# Patient Record
Sex: FEMALE | Race: WHITE | NOT HISPANIC OR LATINO | Employment: OTHER | URBAN - METROPOLITAN AREA
[De-identification: names, ages, dates, MRNs, and addresses within clinical notes are randomized per-mention and may not be internally consistent; named-entity substitution may affect disease eponyms.]

---

## 2018-04-20 PROBLEM — F33.1 MODERATE EPISODE OF RECURRENT MAJOR DEPRESSIVE DISORDER (HCC): Status: ACTIVE | Noted: 2018-04-20

## 2018-04-20 PROBLEM — R23.2 HOT FLASHES: Status: ACTIVE | Noted: 2018-04-20

## 2019-06-05 PROBLEM — H91.93 DECREASED HEARING OF BOTH EARS: Status: ACTIVE | Noted: 2019-06-05

## 2019-06-05 PROBLEM — H93.13 RINGING IN EARS, BILATERAL: Status: ACTIVE | Noted: 2019-06-05

## 2019-07-01 PROBLEM — J30.1 SEASONAL ALLERGIC RHINITIS DUE TO POLLEN: Status: ACTIVE | Noted: 2019-07-01

## 2019-07-08 ENCOUNTER — PREP FOR PROCEDURE (OUTPATIENT)
Dept: SCHEDULING | Facility: MEDICAL CENTER | Age: 74
End: 2019-07-08

## 2019-07-08 PROBLEM — Z96.659 HX OF TOTAL KNEE ARTHROPLASTY: Status: ACTIVE | Noted: 2019-07-08

## 2019-07-08 PROBLEM — T84.84XA PAIN DUE TO INTERNAL ORTHOPEDIC PROSTHETIC DEVICE (HCC): Status: ACTIVE | Noted: 2019-07-08

## 2019-07-08 RX ORDER — ACETAMINOPHEN 500 MG
1000 TABLET ORAL ONCE
Status: CANCELLED | OUTPATIENT
Start: 2019-07-08 | End: 2019-07-08

## 2019-07-08 RX ORDER — CELECOXIB 200 MG/1
200 CAPSULE ORAL ONCE
Status: CANCELLED | OUTPATIENT
Start: 2019-07-08 | End: 2019-07-08

## 2019-07-08 RX ORDER — GABAPENTIN 300 MG/1
600 CAPSULE ORAL ONCE
Status: CANCELLED | OUTPATIENT
Start: 2019-07-08 | End: 2019-07-08

## 2019-07-08 RX ORDER — CEFAZOLIN SODIUM 2 G/100ML
2 INJECTION, SOLUTION INTRAVENOUS ONCE
Status: CANCELLED | OUTPATIENT
Start: 2019-07-08 | End: 2019-07-08

## 2019-08-05 DIAGNOSIS — Z01.812 PRE-OPERATIVE LABORATORY EXAMINATION: Primary | ICD-10-CM

## 2019-08-05 LAB — EKG IMPRESSION: NORMAL

## 2019-08-05 PROCEDURE — 87640 STAPH A DNA AMP PROBE: CPT | Mod: XU

## 2019-08-05 PROCEDURE — 87641 MR-STAPH DNA AMP PROBE: CPT

## 2019-08-05 PROCEDURE — 93010 ELECTROCARDIOGRAM REPORT: CPT | Performed by: INTERNAL MEDICINE

## 2019-08-05 PROCEDURE — 93005 ELECTROCARDIOGRAM TRACING: CPT | Performed by: ORTHOPAEDIC SURGERY

## 2019-08-05 PROCEDURE — 85730 THROMBOPLASTIN TIME PARTIAL: CPT

## 2019-08-05 PROCEDURE — 36415 COLL VENOUS BLD VENIPUNCTURE: CPT

## 2019-08-05 PROCEDURE — 85610 PROTHROMBIN TIME: CPT

## 2019-08-05 RX ORDER — ACETAMINOPHEN 325 MG/1
650 TABLET ORAL EVERY 4 HOURS PRN
COMMUNITY
End: 2023-03-18

## 2019-08-05 RX ORDER — LIOTHYRONINE SODIUM 5 UG/1
5 TABLET ORAL EVERY EVENING
COMMUNITY
End: 2020-07-24

## 2019-08-05 RX ORDER — LIOTHYRONINE SODIUM 25 UG/1
25 TABLET ORAL EVERY MORNING
Status: ON HOLD | COMMUNITY
End: 2019-08-19

## 2019-08-05 RX ORDER — MULTIVIT WITH MINERALS/LUTEIN
1000 TABLET ORAL DAILY
COMMUNITY
End: 2023-03-18

## 2019-08-05 NOTE — OR NURSING
"TOTAL JOINT REPLACEMENT SURGERY   PreAdmit Appointment  Pre-Operative Education Note       1) Did you take a Total Joint Replacement Pre-Operative Education class?  Where?  Answer: no    2) If you did not take a class, did you receive pre-op education in the form of a book or through an online class?    Answer: nlo    3) Have you had the same joint replacement procedure within the last 3 years?   Answer:no (4 yrs in October)    For patients who answered \"No\" to the above questions:     4) Was patient given information on [facility] Pre-Op Education class through the Pre-Op Education Class flyer or the Alternative Pre-op Education flyer?   Answer: yes     5) Was a [facility] Total Joint Replacement Patient Guide binder handed out?   Answer: yes    6) Did the patient refuse preoperative education?  Answer:  no    DISCHARGE PLANNING NOTE - TOTAL JOINT    Procedure: Procedure(s):  REVISION, TOTAL ARTHROPLASTY, KNEE, ALL COMPONENTS  Procedure Date: 8/19/2019  Insurance:    Equipment currently available at home? Walker, grabber  Steps into the home? 2  Steps within the home? 0  Toilet height? elevated  Type of shower? Walk in with bars.  Who will be with you during your recovery? Friend, Brendan who is also her physical therapist  Is Outpatient Physical Therapy set up after surgery? Yes  Did you take the Total Joint Class and where? No     Plan: Pt given home safety checklist and equipment resource guide and reviewed with pt.   "

## 2019-08-05 NOTE — OR NURSING
"Pre-admit appointment completed. \"Preparing for your procedure\" sheet given to pt along with verbal and written instructions. Pt instructed to continue regularly prescribed medications through the day before surgery. Pt instructed to take the following medications the day of surgery with a sip of water, per anesthesia protocol;  Cytomel 35 mcg dose and if needed-tylenol.     Pt states she had clearance for surgery from her PCP and she took it to the [facility]. LM naveed Jo at [facility] to please fax clearance note.    Pt denies pain, burn, or frequency with urination.   "

## 2019-08-06 LAB
APTT PPP: 31.8 SEC (ref 24.7–36)
INR PPP: 0.93 (ref 0.87–1.13)
PROTHROMBIN TIME: 12.6 SEC (ref 12–14.6)
SCCMEC + MECA PNL NOSE NAA+PROBE: NEGATIVE
SCCMEC + MECA PNL NOSE NAA+PROBE: NEGATIVE

## 2019-08-16 ENCOUNTER — ANESTHESIA EVENT (OUTPATIENT)
Dept: SURGERY | Facility: MEDICAL CENTER | Age: 74
DRG: 468 | End: 2019-08-16
Payer: MEDICARE

## 2019-08-19 ENCOUNTER — ANESTHESIA (OUTPATIENT)
Dept: SURGERY | Facility: MEDICAL CENTER | Age: 74
DRG: 468 | End: 2019-08-19
Payer: MEDICARE

## 2019-08-19 ENCOUNTER — APPOINTMENT (OUTPATIENT)
Dept: RADIOLOGY | Facility: MEDICAL CENTER | Age: 74
DRG: 468 | End: 2019-08-19
Attending: ORTHOPAEDIC SURGERY
Payer: MEDICARE

## 2019-08-19 ENCOUNTER — HOSPITAL ENCOUNTER (INPATIENT)
Facility: MEDICAL CENTER | Age: 74
LOS: 1 days | DRG: 468 | End: 2019-08-20
Attending: ORTHOPAEDIC SURGERY | Admitting: ORTHOPAEDIC SURGERY
Payer: MEDICARE

## 2019-08-19 DIAGNOSIS — T84.84XD PAIN DUE TO INTERNAL ORTHOPEDIC PROSTHETIC DEVICE, SUBSEQUENT ENCOUNTER: ICD-10-CM

## 2019-08-19 PROCEDURE — 160029 HCHG SURGERY MINUTES - 1ST 30 MINS LEVEL 4: Performed by: ORTHOPAEDIC SURGERY

## 2019-08-19 PROCEDURE — 770001 HCHG ROOM/CARE - MED/SURG/GYN PRIV*

## 2019-08-19 PROCEDURE — 700105 HCHG RX REV CODE 258: Performed by: ORTHOPAEDIC SURGERY

## 2019-08-19 PROCEDURE — 160048 HCHG OR STATISTICAL LEVEL 1-5: Performed by: ORTHOPAEDIC SURGERY

## 2019-08-19 PROCEDURE — 73560 X-RAY EXAM OF KNEE 1 OR 2: CPT | Mod: RT

## 2019-08-19 PROCEDURE — 501838 HCHG SUTURE GENERAL: Performed by: ORTHOPAEDIC SURGERY

## 2019-08-19 PROCEDURE — 94760 N-INVAS EAR/PLS OXIMETRY 1: CPT

## 2019-08-19 PROCEDURE — 160035 HCHG PACU - 1ST 60 MINS PHASE I: Performed by: ORTHOPAEDIC SURGERY

## 2019-08-19 PROCEDURE — 700111 HCHG RX REV CODE 636 W/ 250 OVERRIDE (IP): Performed by: ANESTHESIOLOGY

## 2019-08-19 PROCEDURE — 700102 HCHG RX REV CODE 250 W/ 637 OVERRIDE(OP): Performed by: ORTHOPAEDIC SURGERY

## 2019-08-19 PROCEDURE — 700105 HCHG RX REV CODE 258: Performed by: ANESTHESIOLOGY

## 2019-08-19 PROCEDURE — C1776 JOINT DEVICE (IMPLANTABLE): HCPCS | Performed by: ORTHOPAEDIC SURGERY

## 2019-08-19 PROCEDURE — 0SPC0JZ REMOVAL OF SYNTHETIC SUBSTITUTE FROM RIGHT KNEE JOINT, OPEN APPROACH: ICD-10-PCS | Performed by: ORTHOPAEDIC SURGERY

## 2019-08-19 PROCEDURE — 160002 HCHG RECOVERY MINUTES (STAT): Performed by: ORTHOPAEDIC SURGERY

## 2019-08-19 PROCEDURE — 160022 HCHG BLOCK: Performed by: ORTHOPAEDIC SURGERY

## 2019-08-19 PROCEDURE — A9270 NON-COVERED ITEM OR SERVICE: HCPCS | Performed by: ORTHOPAEDIC SURGERY

## 2019-08-19 PROCEDURE — L8699 PROSTHETIC IMPLANT NOS: HCPCS | Performed by: ORTHOPAEDIC SURGERY

## 2019-08-19 PROCEDURE — 700101 HCHG RX REV CODE 250: Performed by: ORTHOPAEDIC SURGERY

## 2019-08-19 PROCEDURE — 700111 HCHG RX REV CODE 636 W/ 250 OVERRIDE (IP): Performed by: ORTHOPAEDIC SURGERY

## 2019-08-19 PROCEDURE — 502000 HCHG MISC OR IMPLANTS RC 0278: Performed by: ORTHOPAEDIC SURGERY

## 2019-08-19 PROCEDURE — 0SRC0J9 REPLACEMENT OF RIGHT KNEE JOINT WITH SYNTHETIC SUBSTITUTE, CEMENTED, OPEN APPROACH: ICD-10-PCS | Performed by: ORTHOPAEDIC SURGERY

## 2019-08-19 PROCEDURE — A4306 DRUG DELIVERY SYSTEM <=50 ML: HCPCS | Performed by: ANESTHESIOLOGY

## 2019-08-19 PROCEDURE — 502240 HCHG MISC OR SUPPLY RC 0272: Performed by: ORTHOPAEDIC SURGERY

## 2019-08-19 PROCEDURE — 502579 HCHG PACK, TOTAL KNEE: Performed by: ORTHOPAEDIC SURGERY

## 2019-08-19 PROCEDURE — 160041 HCHG SURGERY MINUTES - EA ADDL 1 MIN LEVEL 4: Performed by: ORTHOPAEDIC SURGERY

## 2019-08-19 PROCEDURE — 700101 HCHG RX REV CODE 250: Performed by: ANESTHESIOLOGY

## 2019-08-19 PROCEDURE — 160009 HCHG ANES TIME/MIN: Performed by: ORTHOPAEDIC SURGERY

## 2019-08-19 PROCEDURE — 160036 HCHG PACU - EA ADDL 30 MINS PHASE I: Performed by: ORTHOPAEDIC SURGERY

## 2019-08-19 PROCEDURE — 700112 HCHG RX REV CODE 229: Performed by: ORTHOPAEDIC SURGERY

## 2019-08-19 DEVICE — STEM PRESS FIT LEGION 13MM X 160MM: Type: IMPLANTABLE DEVICE | Site: KNEE | Status: FUNCTIONAL

## 2019-08-19 DEVICE — IMPLANTABLE DEVICE: Type: IMPLANTABLE DEVICE | Site: KNEE | Status: FUNCTIONAL

## 2019-08-19 DEVICE — CEMENT BONE SIMPLEX W/GENTAICIN (10/PK): Type: IMPLANTABLE DEVICE | Site: KNEE | Status: FUNCTIONAL

## 2019-08-19 DEVICE — COUPLER OFFSET LEGION 4MM: Type: IMPLANTABLE DEVICE | Site: KNEE | Status: FUNCTIONAL

## 2019-08-19 RX ORDER — CHLORPROMAZINE HYDROCHLORIDE 25 MG/1
25 TABLET, FILM COATED ORAL EVERY 6 HOURS PRN
Status: DISCONTINUED | OUTPATIENT
Start: 2019-08-19 | End: 2019-08-20 | Stop reason: HOSPADM

## 2019-08-19 RX ORDER — HALOPERIDOL 5 MG/ML
1 INJECTION INTRAMUSCULAR
Status: DISCONTINUED | OUTPATIENT
Start: 2019-08-19 | End: 2019-08-19 | Stop reason: HOSPADM

## 2019-08-19 RX ORDER — GABAPENTIN 300 MG/1
300 CAPSULE ORAL 3 TIMES DAILY
Status: DISCONTINUED | OUTPATIENT
Start: 2019-08-19 | End: 2019-08-20 | Stop reason: HOSPADM

## 2019-08-19 RX ORDER — SODIUM CHLORIDE, SODIUM LACTATE, POTASSIUM CHLORIDE, CALCIUM CHLORIDE 600; 310; 30; 20 MG/100ML; MG/100ML; MG/100ML; MG/100ML
INJECTION, SOLUTION INTRAVENOUS CONTINUOUS
Status: ACTIVE | OUTPATIENT
Start: 2019-08-19 | End: 2019-08-19

## 2019-08-19 RX ORDER — LIOTHYRONINE SODIUM 5 UG/1
10 TABLET ORAL EVERY MORNING
Status: DISCONTINUED | OUTPATIENT
Start: 2019-08-20 | End: 2019-08-20 | Stop reason: HOSPADM

## 2019-08-19 RX ORDER — ONDANSETRON 2 MG/ML
4 INJECTION INTRAMUSCULAR; INTRAVENOUS EVERY 4 HOURS PRN
Status: DISCONTINUED | OUTPATIENT
Start: 2019-08-19 | End: 2019-08-20 | Stop reason: HOSPADM

## 2019-08-19 RX ORDER — CEFAZOLIN SODIUM 1 G/3ML
INJECTION, POWDER, FOR SOLUTION INTRAMUSCULAR; INTRAVENOUS PRN
Status: DISCONTINUED | OUTPATIENT
Start: 2019-08-19 | End: 2019-08-19 | Stop reason: SURG

## 2019-08-19 RX ORDER — ROPIVACAINE HYDROCHLORIDE 5 MG/ML
INJECTION, SOLUTION EPIDURAL; INFILTRATION; PERINEURAL PRN
Status: DISCONTINUED | OUTPATIENT
Start: 2019-08-19 | End: 2019-08-19 | Stop reason: SURG

## 2019-08-19 RX ORDER — ASCORBIC ACID 500 MG
1000 TABLET ORAL DAILY
Status: DISCONTINUED | OUTPATIENT
Start: 2019-08-19 | End: 2019-08-20 | Stop reason: HOSPADM

## 2019-08-19 RX ORDER — HALOPERIDOL 5 MG/ML
1 INJECTION INTRAMUSCULAR EVERY 6 HOURS PRN
Status: DISCONTINUED | OUTPATIENT
Start: 2019-08-19 | End: 2019-08-20 | Stop reason: HOSPADM

## 2019-08-19 RX ORDER — TRAMADOL HYDROCHLORIDE 50 MG/1
50 TABLET ORAL EVERY 6 HOURS PRN
Status: DISCONTINUED | OUTPATIENT
Start: 2019-08-19 | End: 2019-08-19 | Stop reason: HOSPADM

## 2019-08-19 RX ORDER — DEXAMETHASONE SODIUM PHOSPHATE 4 MG/ML
4 INJECTION, SOLUTION INTRA-ARTICULAR; INTRALESIONAL; INTRAMUSCULAR; INTRAVENOUS; SOFT TISSUE
Status: DISCONTINUED | OUTPATIENT
Start: 2019-08-19 | End: 2019-08-20 | Stop reason: HOSPADM

## 2019-08-19 RX ORDER — HYDROMORPHONE HYDROCHLORIDE 2 MG/ML
INJECTION, SOLUTION INTRAMUSCULAR; INTRAVENOUS; SUBCUTANEOUS PRN
Status: DISCONTINUED | OUTPATIENT
Start: 2019-08-19 | End: 2019-08-19 | Stop reason: SURG

## 2019-08-19 RX ORDER — TRANEXAMIC ACID 100 MG/ML
INJECTION, SOLUTION INTRAVENOUS PRN
Status: DISCONTINUED | OUTPATIENT
Start: 2019-08-19 | End: 2019-08-19 | Stop reason: SURG

## 2019-08-19 RX ORDER — LIOTHYRONINE SODIUM 5 UG/1
5 TABLET ORAL EVERY EVENING
Status: DISCONTINUED | OUTPATIENT
Start: 2019-08-19 | End: 2019-08-20 | Stop reason: HOSPADM

## 2019-08-19 RX ORDER — ONDANSETRON 2 MG/ML
4 INJECTION INTRAMUSCULAR; INTRAVENOUS
Status: DISCONTINUED | OUTPATIENT
Start: 2019-08-19 | End: 2019-08-19 | Stop reason: HOSPADM

## 2019-08-19 RX ORDER — KETOROLAC TROMETHAMINE 30 MG/ML
15 INJECTION, SOLUTION INTRAMUSCULAR; INTRAVENOUS EVERY 6 HOURS
Status: DISCONTINUED | OUTPATIENT
Start: 2019-08-19 | End: 2019-08-20 | Stop reason: HOSPADM

## 2019-08-19 RX ORDER — HYDROMORPHONE HYDROCHLORIDE 1 MG/ML
0.5 INJECTION, SOLUTION INTRAMUSCULAR; INTRAVENOUS; SUBCUTANEOUS
Status: DISCONTINUED | OUTPATIENT
Start: 2019-08-19 | End: 2019-08-20 | Stop reason: HOSPADM

## 2019-08-19 RX ORDER — DEXMEDETOMIDINE HYDROCHLORIDE 100 UG/ML
INJECTION, SOLUTION INTRAVENOUS PRN
Status: DISCONTINUED | OUTPATIENT
Start: 2019-08-19 | End: 2019-08-19 | Stop reason: HOSPADM

## 2019-08-19 RX ORDER — AMOXICILLIN 250 MG
1 CAPSULE ORAL
Status: DISCONTINUED | OUTPATIENT
Start: 2019-08-19 | End: 2019-08-20 | Stop reason: HOSPADM

## 2019-08-19 RX ORDER — ACETAMINOPHEN 500 MG
1000 TABLET ORAL EVERY 6 HOURS
Status: DISCONTINUED | OUTPATIENT
Start: 2019-08-19 | End: 2019-08-20 | Stop reason: HOSPADM

## 2019-08-19 RX ORDER — CHLORPROMAZINE HYDROCHLORIDE 25 MG/ML
25 INJECTION INTRAMUSCULAR EVERY 6 HOURS PRN
Status: DISCONTINUED | OUTPATIENT
Start: 2019-08-19 | End: 2019-08-20 | Stop reason: HOSPADM

## 2019-08-19 RX ORDER — TRAMADOL HYDROCHLORIDE 50 MG/1
50-100 TABLET ORAL EVERY 4 HOURS PRN
Status: DISCONTINUED | OUTPATIENT
Start: 2019-08-19 | End: 2019-08-20 | Stop reason: HOSPADM

## 2019-08-19 RX ORDER — DIPHENHYDRAMINE HCL 25 MG
25 TABLET ORAL EVERY 6 HOURS PRN
Status: DISCONTINUED | OUTPATIENT
Start: 2019-08-19 | End: 2019-08-20 | Stop reason: HOSPADM

## 2019-08-19 RX ORDER — DIPHENHYDRAMINE HYDROCHLORIDE 50 MG/ML
25 INJECTION INTRAMUSCULAR; INTRAVENOUS EVERY 6 HOURS PRN
Status: DISCONTINUED | OUTPATIENT
Start: 2019-08-19 | End: 2019-08-20 | Stop reason: HOSPADM

## 2019-08-19 RX ORDER — MEPERIDINE HYDROCHLORIDE 25 MG/ML
12.5 INJECTION INTRAMUSCULAR; INTRAVENOUS; SUBCUTANEOUS
Status: DISCONTINUED | OUTPATIENT
Start: 2019-08-19 | End: 2019-08-19 | Stop reason: HOSPADM

## 2019-08-19 RX ORDER — ENEMA 19; 7 G/133ML; G/133ML
1 ENEMA RECTAL
Status: DISCONTINUED | OUTPATIENT
Start: 2019-08-19 | End: 2019-08-20 | Stop reason: HOSPADM

## 2019-08-19 RX ORDER — SCOLOPAMINE TRANSDERMAL SYSTEM 1 MG/1
1 PATCH, EXTENDED RELEASE TRANSDERMAL
Status: DISCONTINUED | OUTPATIENT
Start: 2019-08-19 | End: 2019-08-20 | Stop reason: HOSPADM

## 2019-08-19 RX ORDER — ROPIVACAINE HYDROCHLORIDE 5 MG/ML
INJECTION, SOLUTION EPIDURAL; INFILTRATION; PERINEURAL
Status: DISCONTINUED | OUTPATIENT
Start: 2019-08-19 | End: 2019-08-19 | Stop reason: HOSPADM

## 2019-08-19 RX ORDER — POLYETHYLENE GLYCOL 3350 17 G/17G
1 POWDER, FOR SOLUTION ORAL 2 TIMES DAILY PRN
Status: DISCONTINUED | OUTPATIENT
Start: 2019-08-19 | End: 2019-08-20 | Stop reason: HOSPADM

## 2019-08-19 RX ORDER — SODIUM CHLORIDE, SODIUM LACTATE, POTASSIUM CHLORIDE, CALCIUM CHLORIDE 600; 310; 30; 20 MG/100ML; MG/100ML; MG/100ML; MG/100ML
INJECTION, SOLUTION INTRAVENOUS CONTINUOUS
Status: DISCONTINUED | OUTPATIENT
Start: 2019-08-19 | End: 2019-08-19 | Stop reason: HOSPADM

## 2019-08-19 RX ORDER — DEXAMETHASONE SODIUM PHOSPHATE 4 MG/ML
INJECTION, SOLUTION INTRA-ARTICULAR; INTRALESIONAL; INTRAMUSCULAR; INTRAVENOUS; SOFT TISSUE PRN
Status: DISCONTINUED | OUTPATIENT
Start: 2019-08-19 | End: 2019-08-19 | Stop reason: SURG

## 2019-08-19 RX ORDER — AMOXICILLIN 250 MG
1 CAPSULE ORAL NIGHTLY
Status: DISCONTINUED | OUTPATIENT
Start: 2019-08-19 | End: 2019-08-20 | Stop reason: HOSPADM

## 2019-08-19 RX ORDER — HYDRALAZINE HYDROCHLORIDE 20 MG/ML
5 INJECTION INTRAMUSCULAR; INTRAVENOUS
Status: DISCONTINUED | OUTPATIENT
Start: 2019-08-19 | End: 2019-08-19 | Stop reason: HOSPADM

## 2019-08-19 RX ORDER — DULOXETIN HYDROCHLORIDE 30 MG/1
30 CAPSULE, DELAYED RELEASE ORAL DAILY
Status: DISCONTINUED | OUTPATIENT
Start: 2019-08-19 | End: 2019-08-20 | Stop reason: HOSPADM

## 2019-08-19 RX ORDER — LIDOCAINE HYDROCHLORIDE 20 MG/ML
INJECTION, SOLUTION EPIDURAL; INFILTRATION; INTRACAUDAL; PERINEURAL PRN
Status: DISCONTINUED | OUTPATIENT
Start: 2019-08-19 | End: 2019-08-19 | Stop reason: SURG

## 2019-08-19 RX ORDER — BISACODYL 10 MG
10 SUPPOSITORY, RECTAL RECTAL
Status: DISCONTINUED | OUTPATIENT
Start: 2019-08-19 | End: 2019-08-20 | Stop reason: HOSPADM

## 2019-08-19 RX ORDER — DIPHENHYDRAMINE HYDROCHLORIDE 50 MG/ML
6.25 INJECTION INTRAMUSCULAR; INTRAVENOUS
Status: DISCONTINUED | OUTPATIENT
Start: 2019-08-19 | End: 2019-08-19 | Stop reason: HOSPADM

## 2019-08-19 RX ORDER — MIDAZOLAM HYDROCHLORIDE 1 MG/ML
INJECTION INTRAMUSCULAR; INTRAVENOUS PRN
Status: DISCONTINUED | OUTPATIENT
Start: 2019-08-19 | End: 2019-08-19 | Stop reason: SURG

## 2019-08-19 RX ORDER — HYDROMORPHONE HYDROCHLORIDE 2 MG/ML
0.4 INJECTION, SOLUTION INTRAMUSCULAR; INTRAVENOUS; SUBCUTANEOUS
Status: DISCONTINUED | OUTPATIENT
Start: 2019-08-19 | End: 2019-08-19 | Stop reason: HOSPADM

## 2019-08-19 RX ORDER — HYDROMORPHONE HYDROCHLORIDE 2 MG/ML
0.2 INJECTION, SOLUTION INTRAMUSCULAR; INTRAVENOUS; SUBCUTANEOUS
Status: DISCONTINUED | OUTPATIENT
Start: 2019-08-19 | End: 2019-08-19 | Stop reason: HOSPADM

## 2019-08-19 RX ORDER — DOCUSATE SODIUM 100 MG/1
100 CAPSULE, LIQUID FILLED ORAL 2 TIMES DAILY
Status: DISCONTINUED | OUTPATIENT
Start: 2019-08-19 | End: 2019-08-20 | Stop reason: HOSPADM

## 2019-08-19 RX ORDER — DIPHENHYDRAMINE HCL 25 MG
25 TABLET ORAL NIGHTLY PRN
Status: DISCONTINUED | OUTPATIENT
Start: 2019-08-20 | End: 2019-08-20 | Stop reason: HOSPADM

## 2019-08-19 RX ORDER — MAGNESIUM SULFATE HEPTAHYDRATE 500 MG/ML
INJECTION, SOLUTION INTRAMUSCULAR; INTRAVENOUS PRN
Status: DISCONTINUED | OUTPATIENT
Start: 2019-08-19 | End: 2019-08-19 | Stop reason: SURG

## 2019-08-19 RX ORDER — ONDANSETRON 2 MG/ML
INJECTION INTRAMUSCULAR; INTRAVENOUS PRN
Status: DISCONTINUED | OUTPATIENT
Start: 2019-08-19 | End: 2019-08-19 | Stop reason: SURG

## 2019-08-19 RX ORDER — HYDROMORPHONE HYDROCHLORIDE 2 MG/ML
0.1 INJECTION, SOLUTION INTRAMUSCULAR; INTRAVENOUS; SUBCUTANEOUS
Status: DISCONTINUED | OUTPATIENT
Start: 2019-08-19 | End: 2019-08-19 | Stop reason: HOSPADM

## 2019-08-19 RX ADMIN — ONDANSETRON 4 MG: 2 INJECTION INTRAMUSCULAR; INTRAVENOUS at 11:37

## 2019-08-19 RX ADMIN — DOCUSATE SODIUM 100 MG: 100 CAPSULE, LIQUID FILLED ORAL at 17:06

## 2019-08-19 RX ADMIN — SODIUM CHLORIDE, POTASSIUM CHLORIDE, SODIUM LACTATE AND CALCIUM CHLORIDE: 600; 310; 30; 20 INJECTION, SOLUTION INTRAVENOUS at 11:08

## 2019-08-19 RX ADMIN — LIOTHYRONINE SODIUM 5 MCG: 5 TABLET ORAL at 17:06

## 2019-08-19 RX ADMIN — EPHEDRINE SULFATE 10 MG: 50 INJECTION INTRAMUSCULAR; INTRAVENOUS; SUBCUTANEOUS at 10:38

## 2019-08-19 RX ADMIN — EPHEDRINE SULFATE 10 MG: 50 INJECTION INTRAMUSCULAR; INTRAVENOUS; SUBCUTANEOUS at 10:19

## 2019-08-19 RX ADMIN — ACETAMINOPHEN 1000 MG: 500 TABLET, FILM COATED ORAL at 15:23

## 2019-08-19 RX ADMIN — SODIUM CHLORIDE 2 G: 9 INJECTION, SOLUTION INTRAVENOUS at 17:07

## 2019-08-19 RX ADMIN — PROPOFOL 140 MG: 10 INJECTION, EMULSION INTRAVENOUS at 10:03

## 2019-08-19 RX ADMIN — MAGNESIUM SULFATE HEPTAHYDRATE 3 G: 500 INJECTION, SOLUTION INTRAMUSCULAR; INTRAVENOUS at 10:05

## 2019-08-19 RX ADMIN — FENTANYL CITRATE 50 MCG: 50 INJECTION, SOLUTION INTRAMUSCULAR; INTRAVENOUS at 10:03

## 2019-08-19 RX ADMIN — DEXMEDETOMIDINE HYDROCHLORIDE 30 MCG: 100 INJECTION, SOLUTION INTRAVENOUS at 10:03

## 2019-08-19 RX ADMIN — SODIUM CHLORIDE, POTASSIUM CHLORIDE, SODIUM LACTATE AND CALCIUM CHLORIDE: 600; 310; 30; 20 INJECTION, SOLUTION INTRAVENOUS at 13:06

## 2019-08-19 RX ADMIN — HYDROMORPHONE HYDROCHLORIDE 0.5 MG: 2 INJECTION, SOLUTION INTRAMUSCULAR; INTRAVENOUS; SUBCUTANEOUS at 11:34

## 2019-08-19 RX ADMIN — TRANEXAMIC ACID 1000 MG: 100 INJECTION, SOLUTION INTRAVENOUS at 10:05

## 2019-08-19 RX ADMIN — CEFAZOLIN 2 G: 1 INJECTION, POWDER, FOR SOLUTION INTRAVENOUS at 10:03

## 2019-08-19 RX ADMIN — SODIUM CHLORIDE, POTASSIUM CHLORIDE, SODIUM LACTATE AND CALCIUM CHLORIDE: 600; 310; 30; 20 INJECTION, SOLUTION INTRAVENOUS at 08:36

## 2019-08-19 RX ADMIN — SENNOSIDES AND DOCUSATE SODIUM 1 TABLET: 8.6; 5 TABLET ORAL at 20:57

## 2019-08-19 RX ADMIN — FENTANYL CITRATE 50 MCG: 50 INJECTION, SOLUTION INTRAMUSCULAR; INTRAVENOUS at 11:32

## 2019-08-19 RX ADMIN — FENTANYL CITRATE 50 MCG: 50 INJECTION, SOLUTION INTRAMUSCULAR; INTRAVENOUS at 10:29

## 2019-08-19 RX ADMIN — KETOROLAC TROMETHAMINE 15 MG: 30 INJECTION, SOLUTION INTRAMUSCULAR at 15:23

## 2019-08-19 RX ADMIN — FENTANYL CITRATE 25 MCG: 50 INJECTION INTRAMUSCULAR; INTRAVENOUS at 13:06

## 2019-08-19 RX ADMIN — DEXAMETHASONE SODIUM PHOSPHATE 8 MG: 4 INJECTION, SOLUTION INTRAMUSCULAR; INTRAVENOUS at 10:05

## 2019-08-19 RX ADMIN — FENTANYL CITRATE 50 MCG: 50 INJECTION, SOLUTION INTRAMUSCULAR; INTRAVENOUS at 10:37

## 2019-08-19 RX ADMIN — EPHEDRINE SULFATE 10 MG: 50 INJECTION INTRAMUSCULAR; INTRAVENOUS; SUBCUTANEOUS at 11:48

## 2019-08-19 RX ADMIN — EPHEDRINE SULFATE 10 MG: 50 INJECTION INTRAMUSCULAR; INTRAVENOUS; SUBCUTANEOUS at 10:53

## 2019-08-19 RX ADMIN — LIDOCAINE HYDROCHLORIDE 3 ML: 20 INJECTION, SOLUTION EPIDURAL; INFILTRATION; INTRACAUDAL; PERINEURAL at 10:03

## 2019-08-19 RX ADMIN — LIDOCAINE HYDROCHLORIDE 0.5 ML: 10 INJECTION, SOLUTION INFILTRATION; PERINEURAL at 08:28

## 2019-08-19 RX ADMIN — ROPIVACAINE HYDROCHLORIDE 15 ML: 5 INJECTION, SOLUTION EPIDURAL; INFILTRATION; PERINEURAL at 12:30

## 2019-08-19 RX ADMIN — SODIUM CHLORIDE, POTASSIUM CHLORIDE, SODIUM LACTATE AND CALCIUM CHLORIDE: 600; 310; 30; 20 INJECTION, SOLUTION INTRAVENOUS at 17:06

## 2019-08-19 RX ADMIN — RIVAROXABAN 10 MG: 10 TABLET, FILM COATED ORAL at 20:57

## 2019-08-19 RX ADMIN — DULOXETINE HYDROCHLORIDE 30 MG: 30 CAPSULE, DELAYED RELEASE ORAL at 15:23

## 2019-08-19 RX ADMIN — SODIUM CHLORIDE, POTASSIUM CHLORIDE, SODIUM LACTATE AND CALCIUM CHLORIDE: 600; 310; 30; 20 INJECTION, SOLUTION INTRAVENOUS at 15:32

## 2019-08-19 RX ADMIN — OXYCODONE HYDROCHLORIDE AND ACETAMINOPHEN 1000 MG: 500 TABLET ORAL at 15:23

## 2019-08-19 RX ADMIN — MIDAZOLAM HYDROCHLORIDE 1 MG: 1 INJECTION, SOLUTION INTRAMUSCULAR; INTRAVENOUS at 10:03

## 2019-08-19 RX ADMIN — KETOROLAC TROMETHAMINE 15 MG: 30 INJECTION, SOLUTION INTRAMUSCULAR at 20:57

## 2019-08-19 ASSESSMENT — LIFESTYLE VARIABLES
HAVE PEOPLE ANNOYED YOU BY CRITICIZING YOUR DRINKING: NO
EVER HAD A DRINK FIRST THING IN THE MORNING TO STEADY YOUR NERVES TO GET RID OF A HANGOVER: NO
EVER_SMOKED: YES
ALCOHOL_USE: NO
EVER FELT BAD OR GUILTY ABOUT YOUR DRINKING: NO
HOW MANY TIMES IN THE PAST YEAR HAVE YOU HAD 5 OR MORE DRINKS IN A DAY: 0
DOES PATIENT WANT TO STOP DRINKING: NO
TOTAL SCORE: 0
TOTAL SCORE: 0
HAVE YOU EVER FELT YOU SHOULD CUT DOWN ON YOUR DRINKING: NO
TOTAL SCORE: 0
ON A TYPICAL DAY WHEN YOU DRINK ALCOHOL HOW MANY DRINKS DO YOU HAVE: 0
CONSUMPTION TOTAL: NEGATIVE
AVERAGE NUMBER OF DAYS PER WEEK YOU HAVE A DRINK CONTAINING ALCOHOL: 0

## 2019-08-19 ASSESSMENT — COGNITIVE AND FUNCTIONAL STATUS - GENERAL
DRESSING REGULAR LOWER BODY CLOTHING: A LITTLE
WALKING IN HOSPITAL ROOM: A LITTLE
TURNING FROM BACK TO SIDE WHILE IN FLAT BAD: A LITTLE
MOVING FROM LYING ON BACK TO SITTING ON SIDE OF FLAT BED: A LITTLE
DAILY ACTIVITIY SCORE: 23
MOBILITY SCORE: 18
MOVING TO AND FROM BED TO CHAIR: A LITTLE
CLIMB 3 TO 5 STEPS WITH RAILING: A LITTLE
STANDING UP FROM CHAIR USING ARMS: A LITTLE
SUGGESTED CMS G CODE MODIFIER MOBILITY: CK
SUGGESTED CMS G CODE MODIFIER DAILY ACTIVITY: CI

## 2019-08-19 ASSESSMENT — PATIENT HEALTH QUESTIONNAIRE - PHQ9
1. LITTLE INTEREST OR PLEASURE IN DOING THINGS: NOT AT ALL
SUM OF ALL RESPONSES TO PHQ9 QUESTIONS 1 AND 2: 0
2. FEELING DOWN, DEPRESSED, IRRITABLE, OR HOPELESS: NOT AT ALL

## 2019-08-19 NOTE — ANESTHESIA PREPROCEDURE EVALUATION
Past Medical History:   Diagnosis Date   • Arthritis    • Blood clotting disorder (HCC) 2006    right PE   • Bowel habit changes    • Cancer (HCC) 1968    cervical-partial hysterectomy   • Cataract     bilat IOL   • Fecal incontinence 2016    Solesta--anal canal bulking injection; Dr. Gatica   • Heart burn     OTC nexium prn   • Hiatus hernia syndrome    • Hypothyroid    • Indigestion     OTC nexium prn   • Osteopenia    • Osteoporosis    • Pain     chronic back pain   • Reflux    • Right knee pain 08/05/2019   • Urinary bladder disorder     interstitial cystitis.    • Urinary incontinence          Relevant Problems   CARDIAC   (+) Hot flashes      GI   (+) Gastroesophageal reflux disease with hiatal hernia      ENDO   (+) Hypothyroidism due to acquired atrophy of thyroid       Physical Exam    Airway   Mallampati: II  TM distance: >3 FB  Neck ROM: full       Cardiovascular - normal exam  Rhythm: regular  Rate: normal  (-) murmur     Dental - normal exam         Pulmonary - normal exam  Breath sounds clear to auscultation     Abdominal    Neurological - normal exam                 Anesthesia Plan    ASA 2       Plan - general and peripheral nerve block     Peripheral nerve block will be post-op pain control  Airway plan will be LMA  (Adductor canal cath postop per surgeon request)      Induction: intravenous    Postoperative Plan: Postoperative administration of opioids is intended.    Pertinent diagnostic labs and testing reviewed    Informed Consent:    Anesthetic plan and risks discussed with patient.    Use of blood products discussed with: patient whom consented to blood products.

## 2019-08-19 NOTE — ANESTHESIA QCDR
[facility] Clinical Data Registry (for Quality Improvement)     Postoperative nausea/vomiting risk protocol (Adult = 18 yrs and Pediatric 3-17 yrs)- (430 and 463)  General inhalation anesthetic (NOT TIVA) with PONV risk factors: Yes  Provision of anti-emetic therapy with at least 2 different classes of agents: Yes   Patient DID NOT receive anti-emetic therapy and reason is documented in Medical Record:  N/A    Multimodal Pain Management- (AQI59)  Patient undergoing Elective Surgery (i.e. Outpatient, or ASC, or Prescheduled Surgery prior to Hospital Admission): Yes  Use of Multimodal Pain Management, two or more drugs and/or interventions, NOT including systemic opioids: Yes   Exception: Documented allergy to multiple classes of analgesics:  N/A    PACU assessment of acute postoperative pain prior to Anesthesia Care End- Applies to Patients Age = 18- (ABG7)  Initial PACU pain score is which of the following: < 7/10  Patient unable to report pain score: N/A    Post-anesthetic transfer of care checklist/protocol to PACU/ICU- (426 and 427)  Upon conclusion of case, patient transferred to which of the following locations: PACU/Non-ICU  Use of transfer checklist/protocol: Yes  Exclusion: Service Performed in Patient Hospital Room (and thus did not require transfer): N/A    PACU Reintubation- (AQI31)  General anesthesia requiring endotracheal intubation (ETT) along with subsequent extubation in OR or PACU: No  Required reintubation in the PACU: N/A  Extubation was a planned trial documented in the medical record prior to removal of the original airway device: N/A    Unplanned admission to ICU related to anesthesia service up through end of PACU care- (MD51)  Unplanned admission to ICU (not initially anticipated at anesthesia start time): No

## 2019-08-19 NOTE — OR NURSING
1239: To PACU from OR via bed, respirations spontaneous and non-labored. Icepack applied over c/d/i R knee surgical dressings. No pain or nausea, sleepy, but opening eyes spontaneously and to voice. Dressing in CDI, Prevena in place. Dr Davidson hooked up and started On-Q pump.  1249: Sleepy, but arousing to voice, pain is increased, but tolerable.   1259: Pain is increased, no longer tolerable, pain medication will be obtained. Tolerating decrease of supplemental O2.  1306: Pain medication given per MAR, no nausea, supplemental O2 increased for pain medication administration.   1315: Pain is tolerable, no nausea, sleepy but arousing spontaneously and to voice or touch. Dressings remain CDI.  1330: Pain remains tolerable, no nausea, tolerating decrease of supplemental O2 again.  1345: Pain remains tolerable, tolerating sips of water, no nausea, dressings remain CDI. Meets criteria to transfer to the floor.   1356: Report called to RONALD Gonzalez and pt readied for transfer. Pain is tolerable, no nausea, sleepy, but arouses easily to voice. Tolerating 2 LPM of supplemental O2.

## 2019-08-19 NOTE — ADDENDUM NOTE
Addendum  created 08/19/19 1458 by Kyle Davidson M.D.    Order list changed, Order sets accessed

## 2019-08-19 NOTE — ANESTHESIA PROCEDURE NOTES
Airway  Date/Time: 8/19/2019 10:03 AM  Performed by: Kyle Davidson M.D.  Authorized by: Kyle Davidson M.D.     Location:  OR  Urgency:  Elective  Difficult Airway: No    Indications for Airway Management:  Anesthesia  Spontaneous Ventilation: absent    Sedation Level:  Deep  Preoxygenated: Yes    Mask Difficulty Assessment:  1 - vent by mask  Final Airway Type:  Supraglottic airway  Final Supraglottic Airway:  Standard LMA  SGA Size:  3  Number of Attempts at Approach:  1

## 2019-08-19 NOTE — OP REPORT
DATE OF SERVICE:  08/19/2019    PREOPERATIVE DIAGNOSIS:  Failed (unstable) right total knee arthroplasty.    POSTOPERATIVE DIAGNOSIS:  Failed (unstable) right total knee arthroplasty.    PROCEDURE:  Revision right total knee arthroplasty.    SURGEON:  Philip Garcia MD    ASSISTANT:  Pito Clark MD    ANESTHESIA:  General with adductor nerve catheter block.    COMPLICATIONS:  None.    INDICATIONS:  The patient is a 73-year-old woman who had undergone prior right   total knee arthroplasty, but developed persistent pain and symptoms   consistent with instability also identified on physical exam.  The patient's   symptoms persisted despite appropriate conservative therapy and she was   admitted for elective revision right total knee arthroplasty.    DESCRIPTION OF PROCEDURE:  After induction of satisfactory anesthesia, the   patient was positioned supine on the operating room table and a tourniquet   applied to the proximal right thigh.  Intravenous antibiotics were   administered and the right lower extremity prepped and draped in the usual   sterile fashion.    After the usual prep and drape, the limb was exsanguinated with an Esmarch and   tourniquet inflated to 250 mm.  An anteromedial incision was made through the   prior anteromedial scar and this was extended proximally to allow adequate   exposure for revision total knee arthroplasty.  The incision was carried down   through skin and subcutaneous tissue to the level of the extensor mechanism   where full thickness skin and subcutaneous tissue flaps were raised medially   and laterally, and a medial parapatellar arthrotomy performed.  The proximal   medial tibial soft tissue sleeve was elevated in subperiosteal fashion to the   mid coronal plane and the patella subluxed laterally.  Some yellowish fluid   was obtained from the knee joint, but there was no purulent fluid or gross   evidence of infection seen.  Mild discoloration was noted in the synovium and    a synovectomy of the suprapatellar pouch and medial and lateral gutters was   performed as well as release of retropatellar adhesions.  This allowed   adequate exposure to flex the knee and removed the tibial insert.  The lateral   gutter was further debrided of synovium, which mobilized the extensor   mechanism and allowed exposure of the distal femur.  The knee was flexed and   the distal femur implant cement interface divided circumferentially medially   and laterally with a thin oscillating saw blade and curved osteotomes followed   by extracting the component distally largely preserving the underlying bone   stock, which was partially fragmented and partially intact.  The bone cement   was then removed with handheld cement removal, instruments, and a high speed   bur, including removal of intramedullary canal cement with the bur.  This   retained adequate bone stock in the distal femur for reconstruction with a   revision knee replacement.  The synovium in the posterior compartment was   debrided and the tibia subluxed anteriorly to expose the tibial baseplate,   which was then removed by dividing the implant cement interface with a thin   oscillating saw blade and curved osteotomes followed by extracting the   component preserving the underlying bone stock.  Remaining cement was removed   with a high speed arthur, particularly from the intramedullary canal and the   intramedullary canals of the femur and tibia debrided with backhoe curettes.    Care was taken to retain the integrity of the soft tissue sleeve medially and   laterally to further support the revision prosthesis stability.  The   intramedullary canals of the femur and tibia were then reamed to an 11 mm   diameter and 13 mm diameter on the femur.  The 11 mm tibial reamer was left in   place and the tibial cutting block placed over the proximal end for the   endosteal referencing alignment technique and the cutting block pinned in   place to make a  minimal resection of approximately 2 mm from the proximal   tibia and clean up the bone surface.  The cutting block was pinned in place   and resection made with an oscillating saw and resected bone surfaces removed.    Synovium was also debrided from the posterior compartment and the tibia was   then sized for a Legion revision size 2 baseplate, which fit well.  The    for the junction between the stem and implant was then prepared with a   cannulated reamer and a trial reduction performed with a size 2 Legion   revision tibial hinge baseplate with 11 mm diameter x 160 mm length stem   extension, which fit well and flexion and extension gaps assessed with   longitudinal traction both flexion and extension having the baseplate in   place.  The baseplate trial was then removed and attention directed toward   performing the femoral cuts where the 15 mm reamer was used as an alignment   guide and the distal femoral cutting jig placed over this and pinned in place   to make a minimal resection of the medial and lateral condylar bone surfaces.    These were resected to viable appearing healthy cancellous bone and the   epicondylar axis carefully marked.  The femur was sized for a Legion revision   hinge size 3 femoral component and the AP cutting block pinned parallel to the   epicondylar axis, which was 4 mm offset from the intramedullary canal.  The   anterior and posterior cuts were made for the hinged component and the dual   reaming system performed to accommodate the 4 mm offset.  The posterior   compartment was then debrided of any residual bone that had been resected   posteriorly from the femur as well as synovium and redundant scar tissue.  A   trial reduction was then performed with the previously described tibial   baseplate trial and femoral trial consisting of a Legion revision hinge size 3   femoral component with 4 mm offset directed posteriorly and 10 mm medial and   lateral distal augments.  This  provided full extension of the knee with good   position of the patella relative to the joint line and overall excellent   alignment of the limb with motion from 0-140 degrees of flexion.  The patella   was then carefully examined and a Bovie cautery placed between the patella and   cement indicating progressive loosening of the implant likely had occurred   and for this reason, the patellar component was revised.  The patellar   component was removed with a thin oscillating saw blade placing this between   the cemented implant and removing the component atraumatically.  The   underlying pegs were then removed with a high speed bur as well as the   remaining cement mantle leaving the bone stock intact, which appeared suitable   for reconstruction with a biconvex component.  The patella was sized for a 29   mm biconvex component, which was then prepared with the reamer system and a   trial placed, which tracked centrally throughout the arc of motion and   restored appropriate anatomic height of 20 mm with this technique.  The tibial   rotation was marked relative to the femoral component and tibial fin holes   cut through the trial component and then trial components were removed and   knee joint surfaces thoroughly irrigated with pulsatile lavage and dried.  The   final implants were assembled consisting of a Legion revision size 2 tibial   baseplate with 11 mm diameter x 160 mm length stem extension, and a Legion   revision size 3 femoral hinge with 4 mm offset directed posteriorly and 10 mm   medial and lateral distal augments, and 13 mm diameter x 160 mm length stem   extension.  All the modular connections were assembled with appropriate   impaction forces and set screws following 's guidelines and then   cement containing antibiotics was mixed and applied to the exposed tibial bone   surface and underside of the tibial baseplate and stem, which was fully   seated in place in the same rotational  orientation as the trial component with   the center oriented along the medial third of the tibial tubercle.  Excess   cement was removed from the edges of the implant and then the femoral   component was similarly cemented in place with the same technique and fully   seated and excess cement removed from the edges of the implant.  The knee was   brought into extension with an 11 mm trial insert and the 29 mm biconvex   patellar component also cemented in place and held with a clamp and excess   cement removed from the edges of the implant.  Once the cement had set fully,   the tourniquet was released and adequate hemostasis maintained with   electrocautery.  An 11 mm insert was selected and the prosthetic 11 mm hinge   insert inserted following 's guidelines by removing the posterior   tibial screw and replacing this through the insert securing it in position   followed by assembling the hinge mechanism fully and placing the center pin   and locking screw through the tibial portion of the hinge mechanism following   the 's guidelines with appropriate impaction forces and torque to   fully secure the hinge mechanism.  With final implants in place, good overall   limb alignment was confirmed from hip to ankle as well as motion is 0-140   degrees with central patellar tracking throughout the arc of motion and no   impingement.  Patella on the prosthetic components including deep flexion as   well as excellent stability to varus and valgus and AP stress and extension,   mid flexion, and full flexion and throughout the arc of motion.  The wound was   irrigated with dilute Betadine followed by pulsatile saline lavage and the   medial retinacular arthrotomy then closed with a running #1 Quill suture   followed by fully flexing the knee to ensure the integrity of the retinacular   repair.  The subcutaneous layer was closed with interrupted 2-0 Vicryl sutures   and skin closed with staples.  A  Prevena wound VAC was applied as well as a   sterile dressing.  The patient tolerated the procedure well and returned to   recovery room in stable condition.       ____________________________________     MD KATHYA Pitt / NTS    DD:  08/19/2019 12:56:28  DT:  08/19/2019 13:37:42    D#:  [id number]  Job#:  [id number]

## 2019-08-19 NOTE — ANESTHESIA PROCEDURE NOTES
Peripheral Block  Date/Time: 8/19/2019 12:25 PM  Performed by: Kyle Davidson M.D.  Authorized by: Kyle Davidson M.D.     Patient Location:  OR  Start Time:  8/19/2019 12:25 PM  End Time:  8/19/2019 12:32 PM  Reason for Block: at surgeon's request and post-op pain management    patient identified, IV checked, site marked, risks and benefits discussed, surgical consent, monitors and equipment checked, pre-op evaluation and timeout performed    Patient Position:  Supine  Prep: ChloraPrep and patient draped    Monitoring:  Heart rate, continuous pulse ox and cardiac monitor  Block Region:  Lower Extremity  Lower Extremity - Block Type:  Selective FEMORAL nerve block at the Adductor Canal    Laterality:  Right  Procedures: ultrasound guided  Image captured, interpreted and electronically stored.  Strength:  1 %  Dose:  3 ml  Block Type:  Catheter  Needle Length:  100mm (e-cath plus)  Needle Gauge:  18 G  Needle Localization:  Ultrasound guidance  Injection Assessment:  Negative aspiration for heme, no paresthesia on injection, incremental injection and local visualized surrounding nerve on ultrasound  Evidence of intravascular injection: No    Catheter Securement:  E-cath device and tegaderm   US Guided Selective Femoral Nerve Catheter at Adductor Canal:   US probe placed at mid-thigh level on externally rotated leg and femur identified.  Probe directed medially until Sartorius Muscle (SM), Femoral Artery (FA) and Saphenous Nerve (SN) identified in Adductor Canal (AC).  Needle inserted anterolateral to probe in an in plane approach into a subsartorial perivascular perineural position.  After negative aspiration LA injected with ease and visualized spreading within the AC. E-cath advanced with good position in AC. Additional LA through cath with good spread. Secured with sterile dressing.

## 2019-08-19 NOTE — PROGRESS NOTES
Received pt via bed.   Pt is AAO x4.  Denies pain or discomfort at this time.  R knee dressing/prevena/qpump in place. CDI.  Polar ice/SCDs placed.  VS WNL.  Will begin fluids.  Pt oriented to room.  POC discussed.  All needs met at this time.  Bed in low position.  Call light within reach.  Rounding in place.

## 2019-08-19 NOTE — CARE PLAN
Problem: Knowledge Deficit  Goal: Knowledge of disease process/condition, treatment plan, diagnostic tests, and medications will improve  Note:   POC discussed. Pt understands the plan is to walk in hallway 50ft by midnight and urinate at least by 5pm. All questions and concerns addressed.      Problem: Pain Management  Goal: Pain level will decrease to patient's comfort goal  Note:   Will treat any new onset/increasing pain levels with PRN pain meds.

## 2019-08-19 NOTE — OR NURSING
Patient to preop, allergies and NPO status verified, home medications reconciled, belongings secured, verbalizes understanding of pain scale, surgical site verified, IV access established, SCDs, triple aim completed.

## 2019-08-19 NOTE — ANESTHESIA POSTPROCEDURE EVALUATION
Patient: Letitia Ghosh    Procedure Summary     Date:  08/19/19 Room / Location:   OR 06 / SURGERY [facility]    Anesthesia Start:  1000 Anesthesia Stop:  1242    Procedure:  REVISION, TOTAL ARTHROPLASTY, KNEE, ALL COMPONENTS (Right Knee) Diagnosis:       Pain due to internal orthopedic prosthetic device, sequela      Hx of total knee arthroplasty      (Pain due to internal orthopedic prosthetic device, sequela [[id number]])    Surgeon:  Philip Garcia M.D. Responsible Provider:  Kyle Davidson M.D.    Anesthesia Type:  general, peripheral nerve block ASA Status:  2          Final Anesthesia Type: general, peripheral nerve block  Last vitals  BP   Blood Pressure : 129/56    Temp   37.2 °C (99 °F)    Pulse   Pulse: 68, Heart Rate (Monitored): 76   Resp   14    SpO2   95 %      Anesthesia Post Evaluation    Patient location during evaluation: PACU  Patient participation: complete - patient participated  Level of consciousness: awake and alert    Airway patency: patent  Anesthetic complications: no  Cardiovascular status: hemodynamically stable  Respiratory status: acceptable  Hydration status: euvolemic    PONV: none           Nurse Pain Score: 6 (NPRS)

## 2019-08-20 VITALS
BODY MASS INDEX: 27.35 KG/M2 | HEART RATE: 76 BPM | RESPIRATION RATE: 18 BRPM | SYSTOLIC BLOOD PRESSURE: 124 MMHG | OXYGEN SATURATION: 95 % | HEIGHT: 61 IN | DIASTOLIC BLOOD PRESSURE: 51 MMHG | TEMPERATURE: 97.5 F | WEIGHT: 144.84 LBS

## 2019-08-20 LAB
HCT VFR BLD AUTO: 37.6 % (ref 37–47)
HGB BLD-MCNC: 11.9 G/DL (ref 12–16)

## 2019-08-20 PROCEDURE — A9270 NON-COVERED ITEM OR SERVICE: HCPCS | Performed by: ORTHOPAEDIC SURGERY

## 2019-08-20 PROCEDURE — 700102 HCHG RX REV CODE 250 W/ 637 OVERRIDE(OP): Performed by: ORTHOPAEDIC SURGERY

## 2019-08-20 PROCEDURE — 97165 OT EVAL LOW COMPLEX 30 MIN: CPT

## 2019-08-20 PROCEDURE — 85014 HEMATOCRIT: CPT

## 2019-08-20 PROCEDURE — 700112 HCHG RX REV CODE 229: Performed by: ORTHOPAEDIC SURGERY

## 2019-08-20 PROCEDURE — 700105 HCHG RX REV CODE 258: Performed by: ORTHOPAEDIC SURGERY

## 2019-08-20 PROCEDURE — 85018 HEMOGLOBIN: CPT

## 2019-08-20 PROCEDURE — 36415 COLL VENOUS BLD VENIPUNCTURE: CPT

## 2019-08-20 PROCEDURE — 97110 THERAPEUTIC EXERCISES: CPT

## 2019-08-20 PROCEDURE — 700111 HCHG RX REV CODE 636 W/ 250 OVERRIDE (IP): Performed by: ORTHOPAEDIC SURGERY

## 2019-08-20 PROCEDURE — 97161 PT EVAL LOW COMPLEX 20 MIN: CPT

## 2019-08-20 RX ORDER — POLYETHYLENE GLYCOL 3350 17 G/17G
17 POWDER, FOR SOLUTION ORAL DAILY
Qty: 5 EACH | Refills: 0 | Status: SHIPPED
Start: 2019-08-20 | End: 2020-07-24

## 2019-08-20 RX ORDER — TRAMADOL HYDROCHLORIDE 50 MG/1
50-100 TABLET ORAL EVERY 4 HOURS PRN
Qty: 42 TAB | Refills: 0 | Status: SHIPPED | OUTPATIENT
Start: 2019-08-20 | End: 2019-08-27

## 2019-08-20 RX ORDER — AMOXICILLIN 250 MG
1 CAPSULE ORAL DAILY
Qty: 30 TAB | Refills: 0 | Status: SHIPPED
Start: 2019-08-20 | End: 2020-07-24

## 2019-08-20 RX ADMIN — DOCUSATE SODIUM 100 MG: 100 CAPSULE, LIQUID FILLED ORAL at 06:42

## 2019-08-20 RX ADMIN — LIOTHYRONINE SODIUM 10 MCG: 5 TABLET ORAL at 06:38

## 2019-08-20 RX ADMIN — KETOROLAC TROMETHAMINE 15 MG: 30 INJECTION, SOLUTION INTRAMUSCULAR at 03:50

## 2019-08-20 RX ADMIN — DULOXETINE HYDROCHLORIDE 30 MG: 30 CAPSULE, DELAYED RELEASE ORAL at 06:42

## 2019-08-20 RX ADMIN — ACETAMINOPHEN 1000 MG: 500 TABLET, FILM COATED ORAL at 01:17

## 2019-08-20 RX ADMIN — OXYCODONE HYDROCHLORIDE AND ACETAMINOPHEN 1000 MG: 500 TABLET ORAL at 08:08

## 2019-08-20 RX ADMIN — KETOROLAC TROMETHAMINE 15 MG: 30 INJECTION, SOLUTION INTRAMUSCULAR at 08:08

## 2019-08-20 RX ADMIN — ACETAMINOPHEN 1000 MG: 500 TABLET, FILM COATED ORAL at 06:38

## 2019-08-20 RX ADMIN — SODIUM CHLORIDE 2 G: 9 INJECTION, SOLUTION INTRAVENOUS at 01:17

## 2019-08-20 ASSESSMENT — GAIT ASSESSMENTS
DEVIATION: DECREASED HEEL STRIKE;DECREASED TOE OFF
GAIT LEVEL OF ASSIST: SUPERVISED
DISTANCE (FEET): 75
ASSISTIVE DEVICE: FRONT WHEEL WALKER

## 2019-08-20 ASSESSMENT — ACTIVITIES OF DAILY LIVING (ADL): TOILETING: INDEPENDENT

## 2019-08-20 NOTE — THERAPY
"Occupational Therapy Evaluation completed.   Functional Status: S/p R TKA. WBAT RLE. Pt presents A&Ox4, motivated for activity. Performs ADL transfes with Sup, FWW. Shows good balance when up, good safety awareness. Toileting, grooming with Sup. LB dressing with Sup, except shoes ([place]). Owns a shoe horn, reacher. Reviewed home safety during ADL's.        Plan of Care: Patient with no further skilled OT needs in the acute care setting at this time  Discharge Recommendations:  Equipment: No Equipment Needed. Post-acute therapy Discharge to home with outpatient for additional skilled therapy services. Pt lives alone. Her OP PT/friend will stay the first night and another friend will stay with pt for a few days.  See \"Rehab Therapy-Acute\" Patient Summary Report for complete documentation.    "

## 2019-08-20 NOTE — PROGRESS NOTES
Pt is AOx4, VSS. Denies any pain at this time, sx dressing to right knee, c/d/i. Polar ice in place, scds and teds in place. She is able to dorsi/plantar flex without problems. Denies pain to back of her calf. Denies any numbness/tingling sensations. Cap refill good. Able to wiggles toes. Pedal pulses palpable.     prevena dressing intact, On-Q pump in place and remains intact.    Bed is locked and low, controls on call light buttons within reach. Instructed to call for needs/help, demonstrates properly.    Knee and fall precautions in place.

## 2019-08-20 NOTE — THERAPY
"Physical Therapy Evaluation completed.   Bed Mobility:  Supine to Sit: (NT, pt sitting up in chair)  Transfers: Sit to Stand: Supervised  Gait: Level Of Assist: Supervised with Front-Wheel Walker       Plan of Care: Patient with no further skilled PT needs in the acute care setting at this time  Discharge Recommendations: Equipment: No Equipment Needed. Post-acute therapy Discharge to home with outpatient or home health for additional skilled therapy services.    Pt is a 72 yo female s/p R TKA revision POD#1. Pt is moving well, steady with FWW. HEP reviewed and handout issued. Pt is ready to DC home with assist of friends at time of DC. Pt plans to f/u with outpt PT next Monday. No further acute PT needs, DC PT.    See \"Rehab Therapy-Acute\" Patient Summary Report for complete documentation.     "

## 2019-08-20 NOTE — PROGRESS NOTES
Patient discharged to home per order.  Reviewed all discharge instructions, appointments, discharge medications, and wound care instructions with patient and they verbalize understanding.   Discharge paperwork completed; signed copies in chart.  Patient has education binder and all belongings; signed copy in chart.  Pt alert, calm, stable; no change in status from morning assessment.  Patient left facility at 1128 via wheel chair accompanied and escorted to car by staff.

## 2019-08-20 NOTE — PROGRESS NOTES
Received bedside shift report regarding patient and assumed care. Patient is awake, calm and stable, currently positioned in bed for comfort and safety; call light within reach. Denies any pain or discomfort at this time.  On-Q pump is intact. Will continue to monitor.

## 2019-08-20 NOTE — CARE PLAN
Problem: Safety  Goal: Will remain free from injury  Outcome: PROGRESSING AS EXPECTED    Pt is a sba with transfers and uses call light for assistance. Hourly rounds in place.      Problem: Pain Management  Goal: Pain level will decrease to patient's comfort goal  Outcome: PROGRESSING AS EXPECTED    Pt has an On-Q pump running at 6ml/hr. Pt reports of no pain at this time. Will continue to monitor.

## 2019-08-20 NOTE — DISCHARGE INSTRUCTIONS
Wound vac may stay in place until the battery runs out. Then can dress with gauze and tape   Take all medications as prescribed.   Please call [facility] with any questions or concerns.    Discharge Instructions    Discharged to home by car with relative. Discharged via wheelchair, hospital escort: Yes.  Special equipment needed: Not Applicable    Be sure to schedule a follow-up appointment with your primary care doctor or any specialists as instructed.     Discharge Plan:   Influenza Vaccine Indication: Patient Refuses    I understand that a diet low in cholesterol, fat, and sodium is recommended for good health. Unless I have been given specific instructions below for another diet, I accept this instruction as my diet prescription.   Other diet: regular    Special Instructions: Discharge instructions for the Orthopedic Patient    Follow up with Primary Care Physician within 2 weeks of discharge to home, regarding:  Review of medications and diagnostic testing.  Surveillance for medical complications.  Workup and treatment of osteoporosis, if appropriate.     -Is this a Joint Replacement patient? Yes Total Joint Knee Replacement Discharge Instructions    Pain  - The goal is to slowly wean off the prescription pain medicine.  - Ice can be used for pain control.  20 minutes at a time is recommended, and never directly against your skin or incision.  - Most patients are off the pain pills by 3 weeks; others may require a low level of pain medications for many months.  If your pain continues to be severe, follow up with your physician.  Infection    Knee joint infections; occur in fewer than 2% of patients. The most common causes of infection following total knee replacement surgery are from bacteria that enter the bloodstream during dental procedures, urinary tract infections, or skin infections. These bacteria can lodge around your knee replacement and cause an infection.  - Keep the incision as clean and  dry as possible.  - Always wash your hands before touching your incision.  - Skin infections tend to develop around 7-10 days after surgery; most can be treated with oral antibiotics.  - Dental Care should be delayed for 3 months after surgery, your surgeon recommends taking a dose of antibiotics 1 hour prior to any dental procedure. After 2 years, most surgeons recommend antibiotics only before an extensive procedure.  Ask your surgeon what he recommends.  - Signs and symptoms of infection can include:  low grade fever, redness, pain, swelling and drainage from your incision.  Notify your surgeon immediately if you develop any of these symptoms.  Other instructions  - Bowel habits - constipation is extremely common and is caused by a combination of anesthesia, lack of mobility and pain medicine.  Use stool softeners or laxatives if necessary. It is important not to ignore this problem, as bowel obstructions can be a serious complication after joint replacement surgery.  - Mood/Energy Level - Many patients experience a lack of energy and endurance for up to 2-3 months after surgery.  Some may also feel down and can even become depressed.  This is likely due to the postoperative anemia, change in activity level, lack of sleep, pain medicine and just the emotional reaction to the surgery itself that is a big disruption in a person’s life.  This usually passes.  If symptoms persist, follow up with your primary physician.  - Returning to work - Your surgeon will give you more specific instructions. Depending on the type of activities you perform, it may be 6 to 8 weeks before you return to work.   Generally, if you work a sedentary job requiring little standing or walking, most patients may return within 2-6 weeks.  Manual labor jobs involving walking, lifting and standing may take longer. Your surgeon’s office can provide a release to part-time or light duty work early on in your recovery and progress you to full duty  as able.    - Driving - If your left knee was replaced and you have an automatic transmission, you may be able to begin driving in a week or so, provided you are no longer taking narcotic pain medication. If your right knee was replaced, avoid driving for 6 to 8 weeks. Remember that your reflexes may not be as sharp as before your surgery. Ask your surgeon for specific instructions.   - Avoiding falls - A fall during the first few weeks after surgery can damage your new knee and may result in a need for further surgery.   throw rugs and tack down loose carpeting.  Be aware of floor hazards such as pets, small objects or uneven surfaces.    - Airport Metal Detectors - The sensitivity of metal detectors varies and it is likely that your prosthesis will cause an alarm.  Inform the  of your artificial joint.  Diet  - Resume your normal diet as tolerated.  - It is important to achieve a healthy nutritional status by eating a well balanced diet on a regular basis.  - Your physician may recommend that you take iron and vitamin supplements.   - Continue to drink plenty of fluids.  Shower/Bathing  - You may shower as soon as you get home from the hospital unless otherwise instructed.  - Keep your incision out of water.  To keep the incision dry when showering, cover it with a plastic bag or plastic wrap.  - Pat incision dry if it gets wet.  Don’t rub.  - Do not submerge in a bath until staples are out and the incision is completely healed. (Approximately 6-8 weeks)  Dressing Change:  Procedure (if recommended by your physician)  - Wash hands.  - Open all new dressing change materials.  - Remove old dressing and discard.  - Inspect incision for redness, increase in clear drainage, yellow/green drainage, odor and surrounding skin hot to touch.  -  ABD (large gauze) pad or “island dressing” by one corner and lay over the incision.  Be careful not to touch the inside of the dressing that will lay  over the incision.  - Secure in place as instructed (Ace wrap or tape).    Swelling/Bruising    - Swelling can last from 3-6 months.  - Elevate your leg higher than your heart while reclining.   The first week you are home you should elevate your leg an equal amount of time, as you are active.    - Anti-inflammatory pills can be taken once you have stopped the blood thinners.  - The swelling is usually worse after you go home since you are upright for longer periods of time.  - Bruising is common and can involve the entire leg including the thigh, calf and even foot. Bruising often does not appear until after you arrive home and it can be quite dramatic- purple, black, and green.  The bruising you can see is not usually concerning and will subside without any treatment.      Blood Clot Prevention  Blood clots in the legs and the less common, but frightening, clots that travel to the lungs are a real focus of our preventative. Most patients are at standard risk for them, but those patients who are at higher risk include people who have had previous clots, a family history of clotting, smoking, diabetes, obesity, advanced age, use of estrogen and a sedentary lifestyle.    - Signs of blood clots in legs - Swelling in thigh, calf or ankle that does not go down with elevation.  Pain, heat and tenderness in calf, back of calf or groin area.  NOTE: blood clots can occur in either leg.  - You have been receiving anticoagulant therapy (blood thinners) in the hospital and you may be instructed to continue at home depending on your risk factors.  - Your risk for developing a clot continues for up to 2-3 months after surgery.  You should avoid prolonged sitting and dehydration during that time (long air trips and car trips).  If you do take a trip during this time, please get up and move around every 1- 1.5 hours.  - If you are prescribed blood-thinning medication for home, follow instructions as directed. (Handouts provided if  applicable).      Activity  Once home, you should continue to stay active. The key is to remember not to overdo it! While you can expect some good days and some bad days, you should notice a gradual improvement and a gradual increase in your endurance over the next 6 to 12 months. Exercise is a critical component of home care, particularly during the first few weeks after surgery.     - Normal activities of daily living You should be able to resume most within 3 to 6 weeks following surgery. Some pain with activity and at night is common for several weeks after surgery  -  Physical Therapy Exercises - Continue to do the exercises prescribed for at least two months after surgery. Riding a stationary bicycle can help maintain muscle tone and keep your knee flexible. Try to achieve the maximum degree of bending and extension possible. (handout provided by Therapist).  - Sexual Activity -. Your surgeon can tell you when it safe to resume sexual activity.    - Sleeping Positions - You can safely sleep on your back, on either side, or on your stomach.   - Other Activities - Walk as much as you like, but remember that walking is no substitute for the exercises your doctor and physical therapist will prescribe. Lower impact activities are preferred.  If you have specific questions, consult your Surgeon.    When to Call the Doctor   Call the physician if:   - Fever over 100.5? F  - Increased pain, drainage, redness, odor or heat around the incision area  - Shaking chills  - Increased knee pain with activity and rest  - Increased pain in calf, tenderness or redness above or below the knee  - Increased swelling of calf, ankle, foot  - Sudden increased shortness of breath, sudden onset of chest pain, localized chest pain with coughing  - Incision opening  Or, if there are any questions or concerns about medications or care.       -Is this patient being discharged with medication to prevent blood clots?  Yes, Xarelto Rivaroxaban  oral tablets  What is this medicine?  RIVAROXABAN (ri va WAGNER a ban) is an anticoagulant (blood thinner). It is used to treat blood clots in the lungs or in the veins. It is also used after knee or hip surgeries to prevent blood clots. It is also used to lower the chance of stroke in people with a medical condition called atrial fibrillation.  This medicine may be used for other purposes; ask your health care provider or pharmacist if you have questions.  COMMON BRAND NAME(S): Xarelto, Xarelto Starter Pack  What should I tell my health care provider before I take this medicine?  They need to know if you have any of these conditions:  -bleeding disorders  -bleeding in the brain  -blood in your stools (black or tarry stools) or if you have blood in your vomit  -history of stomach bleeding  -kidney disease  -liver disease  -low blood counts, like low white cell, platelet, or red cell counts  -recent or planned spinal or epidural procedure  -take medicines that treat or prevent blood clots  -an unusual or allergic reaction to rivaroxaban, other medicines, foods, dyes, or preservatives  -pregnant or trying to get pregnant  -breast-feeding  How should I use this medicine?  Take this medicine by mouth with a glass of water. Follow the directions on the prescription label. Take your medicine at regular intervals. Do not take it more often than directed. Do not stop taking except on your doctor's advice. Stopping this medicine may increase your risk of a blood clot. Be sure to refill your prescription before you run out of medicine.  If you are taking this medicine after hip or knee replacement surgery, take it with or without food. If you are taking this medicine for atrial fibrillation, take it with your evening meal. If you are taking this medicine to treat blood clots, take it with food at the same time each day. If you are unable to swallow your tablet, you may crush the tablet and mix it in applesauce. Then, immediately  eat the applesauce. You should eat more food right after you eat the applesauce containing the crushed tablet.  Talk to your pediatrician regarding the use of this medicine in children. Special care may be needed.  Overdosage: If you think you have taken too much of this medicine contact a poison control center or emergency room at once.  NOTE: This medicine is only for you. Do not share this medicine with others.  What if I miss a dose?  If you take your medicine once a day and miss a dose, take the missed dose as soon as you remember. If you take your medicine twice a day and miss a dose, take the missed dose immediately. In this instance, 2 tablets may be taken at the same time. The next day you should take 1 tablet twice a day as directed.  What may interact with this medicine?  Do not take this medicine with any of the following medications:  -defibrotide  This medicine may also interact with the following medications:  -aspirin and aspirin-like medicines  -certain antibiotics like erythromycin, azithromycin, and clarithromycin  -certain medicines for fungal infections like ketoconazole and itraconazole  -certain medicines for irregular heart beat like amiodarone, quinidine, dronedarone  -certain medicines for seizures like carbamazepine, phenytoin  -certain medicines that treat or prevent blood clots like warfarin, enoxaparin, and dalteparin  -conivaptan  -diltiazem  -felodipine  -indinavir  -lopinavir; ritonavir  -NSAIDS, medicines for pain and inflammation, like ibuprofen or naproxen  -ranolazine  -rifampin  -ritonavir  -SNRIs, medicines for depression, like desvenlafaxine, duloxetine, levomilnacipran, venlafaxine  -SSRIs, medicines for depression, like citalopram, escitalopram, fluoxetine, fluvoxamine, paroxetine, sertraline  -Nadira's wort  -verapamil  This list may not describe all possible interactions. Give your health care provider a list of all the medicines, herbs, non-prescription drugs, or  dietary supplements you use. Also tell them if you smoke, drink alcohol, or use illegal drugs. Some items may interact with your medicine.  What should I watch for while using this medicine?  Visit your doctor or health care professional for regular checks on your progress.  Notify your doctor or health care professional and seek emergency treatment if you develop breathing problems; changes in vision; chest pain; severe, sudden headache; pain, swelling, warmth in the leg; trouble speaking; sudden numbness or weakness of the face, arm or leg. These can be signs that your condition has gotten worse.  If you are going to have surgery or other procedure, tell your doctor that you are taking this medicine.  What side effects may I notice from receiving this medicine?  Side effects that you should report to your doctor or health care professional as soon as possible:  -allergic reactions like skin rash, itching or hives, swelling of the face, lips, or tongue  -back pain  -redness, blistering, peeling or loosening of the skin, including inside the mouth  -signs and symptoms of bleeding such as bloody or black, tarry stools; red or dark-brown urine; spitting up blood or brown material that looks like coffee grounds; red spots on the skin; unusual bruising or bleeding from the eye, gums, or nose  Side effects that usually do not require medical attention (report to your doctor or health care professional if they continue or are bothersome):  -dizziness  -muscle pain  This list may not describe all possible side effects. Call your doctor for medical advice about side effects. You may report side effects to FDA at 4-779-FDA-8371.  Where should I keep my medicine?  Keep out of the reach of children.  Store at room temperature between 15 and 30 degrees C (59 and 86 degrees F). Throw away any unused medicine after the expiration date.  NOTE: This sheet is a summary. It may not cover all possible information. If you have  questions about this medicine, talk to your doctor, pharmacist, or health care provider.  © 2018 [employer]/Gold Standard (2017-09-06 16:29:33)      · Is patient discharged on Warfarin / Coumadin?   No     Depression / Suicide Risk    As you are discharged from this [facility] facility, it is important to learn how to keep safe from harming yourself.    Recognize the warning signs:  · Abrupt changes in personality, positive or negative- including increase in energy   · Giving away possessions  · Change in eating patterns- significant weight changes-  positive or negative  · Change in sleeping patterns- unable to sleep or sleeping all the time   · Unwillingness or inability to communicate  · Depression  · Unusual sadness, discouragement and loneliness  · Talk of wanting to die  · Neglect of personal appearance   · Rebelliousness- reckless behavior  · Withdrawal from people/activities they love  · Confusion- inability to concentrate     If you or a loved one observes any of these behaviors or has concerns about self-harm, here's what you can do:  · Talk about it- your feelings and reasons for harming yourself  · Remove any means that you might use to hurt yourself (examples: pills, rope, extension cords, firearm)  · Get professional help from the community (Mental Health, Substance Abuse, psychological counseling)  · Do not be alone:Call your Safe Contact- someone whom you trust who will be there for you.  · Call your local CRISIS HOTLINE 030-5046 or 728-504-7590  · Call your local Children's Mobile Crisis Response Team [place] (339) 618-1346 or www.Qpyn  · Call the toll free National Suicide Prevention Hotlines   · National Suicide Prevention Lifeline 764-125-ZFSV (3506)  · National Hope Line Network 800-SUICIDE (537-4248)

## 2019-08-20 NOTE — PROGRESS NOTES
"Orthopedic Progress Note     Subjective:      Patient reports doing well. Patient denies chest pain, calf pain, shortness of breath.  Pain is currently under control. Patient is ambulating well with the use of an assistive device.    Objective:    Alert and oriented x 3  Afebrile  /40   Pulse 71   Temp 36.4 °C (97.5 °F) (Oral)   Resp 18   Ht 1.549 m (5' 1\")   Wt 65.7 kg (144 lb 13.5 oz)   SpO2 97%                 Intake/Output Summary (Last 24 hours) at 8/20/2019 0431  Last data filed at 8/20/2019 0117  Gross per 24 hour   Intake 2236 ml   Output 2050 ml   Net 186 ml       Comfortable, no distress  Neurologically and vascularly intact with palpable pedal pulses bilaterally.  Dressing C/D/I, W[place] functioning      Assessment:    Doing well s/p revision right total knee arthroplasty.    Plan:      Diet as tolerated  Mobilize today - WBAT  OT/PT  DVT ppx - xarelto + Sequential Compression Devices  Plan to discharge home today  Follow-up with Dr. Garcia office approximately 1 week postop  "

## 2019-08-20 NOTE — DISCHARGE SUMMARY
Patient was admitted for a revision right total knee arthroplasty.  There were no complications during the surgery. Did well post-operatively.               Active Hospital Problems    Diagnosis   • Pain due to internal orthopedic prosthetic device (HCC) [T84.84XA]     Added automatically from request for surgery [id number]     • Hx of total knee arthroplasty [Z96.659]     Added automatically from request for surgery [id number]         Uneventful hospital course.     Medication List      START taking these medications      Instructions   polyethylene glycol/lytes Pack  Commonly known as:  MIRALAX   Take 1 Packet by mouth every day.  Dose:  17 g     rivaroxaban 10 MG Tabs tablet  Commonly known as:  XARELTO   Take 1 Tab by mouth with dinner.  Dose:  10 mg     senna-docusate 8.6-50 MG Tabs  Commonly known as:  PERICOLACE or SENOKOT S   Take 1 Tab by mouth every day.  Dose:  1 Tab     tramadol 50 MG Tabs  Commonly known as:  ULTRAM   Take 1-2 Tabs by mouth every four hours as needed (Give one for moderate and 2 for severe pain, patient has requested no opiates if possible) for up to 7 days.  Dose:   mg        CHANGE how you take these medications      Instructions   * liothyronine 5 MCG Tabs  What changed:  Another medication with the same name was changed. Make sure you understand how and when to take each.  Commonly known as:  CYTOMEL   Take 5 mcg by mouth every evening.  Dose:  5 mcg     * liothyronine 5 MCG Tabs  What changed:    · how much to take  · when to take this  Commonly known as:  CYTOMEL   Take 1 Tab by mouth 2 Times a Day. BRAND NAME ONLY  Dose:  5 mcg     * CYTOMEL 5 MCG Tabs  What changed:  Another medication with the same name was changed. Make sure you understand how and when to take each.  Generic drug:  liothyronine   2 pills by mouth in morning. 1 pill by mouth in evening.         * This list has 3 medication(s) that are the same as other medications prescribed for you. Read the directions  "carefully, and ask your doctor or other care provider to review them with you.            CONTINUE taking these medications      Instructions   acetaminophen 325 MG Tabs  Commonly known as:  TYLENOL   Take 650 mg by mouth every four hours as needed.  Dose:  650 mg     Calcium 500-100 MG-UNIT Chew   Take 1,000 mg by mouth every day.  Dose:  1,000 mg     DULoxetine 30 MG Cpep  Commonly known as:  CYMBALTA   Doctor's comments:  Dispense generic name only.  Take 1 Cap by mouth every day. Take 1 cap by mouth daily. Dispense generic name.  Dose:  30 mg     meloxicam 15 MG tablet  Commonly known as:  MOBIC   Doctor's comments:  Generic is ok due to availability  Take 1 Tab by mouth every day.  Dose:  15 mg     NON SPECIFIED   Take 2 Tabs by mouth every evening. \"strontium\" OTC for bones  Dose:  2 Tab     therapeutic multivitamin-minerals Tabs   Take 1 Tab by mouth 2 Times a Day.  Dose:  1 Tab     Vitamin C 1000 MG Tabs   Take 1,000 mg by mouth every day.  Dose:  1,000 mg              Patient will be discharged home and follow up with Dr. Garcia in 1 weeks, for which the patient already has scheduled. [facility] office phone number is [phone].  Patient to begin Physical Therapy as currently scheduled.  Encourage ROM of the knee.     Instructions:  -Leave the Wound vac on while functioning  -Call if there is drainage beneath the bandage  -Use ice and elevation frequently to help with pain and swelling control  -Put as much weight as comfortable on the operative leg.  Use a walker to assist with balance.  -Take your blood clot prevention medication for 4 weeks after surgery.  "

## 2020-07-10 ENCOUNTER — HOSPITAL ENCOUNTER (OUTPATIENT)
Dept: RADIOLOGY | Facility: MEDICAL CENTER | Age: 75
End: 2020-07-10
Payer: MEDICARE

## 2020-07-22 ENCOUNTER — HOSPITAL ENCOUNTER (OUTPATIENT)
Dept: RADIOLOGY | Facility: MEDICAL CENTER | Age: 75
End: 2020-07-22
Payer: MEDICARE

## 2020-08-20 PROBLEM — M81.0 SENILE OSTEOPOROSIS: Status: ACTIVE | Noted: 2020-08-20

## 2020-09-11 ENCOUNTER — HOSPITAL ENCOUNTER (OUTPATIENT)
Dept: HOSPITAL 8 - STAR | Age: 75
Discharge: HOME | End: 2020-09-11
Attending: NEUROLOGICAL SURGERY
Payer: MEDICARE

## 2020-09-11 DIAGNOSIS — M48.061: ICD-10-CM

## 2020-09-11 DIAGNOSIS — Z20.828: ICD-10-CM

## 2020-09-11 DIAGNOSIS — Z01.812: Primary | ICD-10-CM

## 2020-09-11 PROCEDURE — 36415 COLL VENOUS BLD VENIPUNCTURE: CPT

## 2020-09-11 PROCEDURE — 87635 SARS-COV-2 COVID-19 AMP PRB: CPT

## 2020-10-27 ENCOUNTER — HOSPITAL ENCOUNTER (OUTPATIENT)
Dept: RADIOLOGY | Facility: MEDICAL CENTER | Age: 75
End: 2020-10-27
Payer: MEDICARE

## 2020-12-23 ENCOUNTER — HOSPITAL ENCOUNTER (OUTPATIENT)
Dept: RADIOLOGY | Facility: MEDICAL CENTER | Age: 75
End: 2020-12-23
Payer: MEDICARE

## 2021-01-12 DIAGNOSIS — Z23 NEED FOR VACCINATION: ICD-10-CM

## 2021-03-24 ENCOUNTER — HOSPITAL ENCOUNTER (OUTPATIENT)
Dept: RADIOLOGY | Facility: MEDICAL CENTER | Age: 76
End: 2021-03-24
Payer: MEDICARE

## 2022-09-22 PROBLEM — G56.22 CUBITAL TUNNEL SYNDROME ON LEFT: Status: ACTIVE | Noted: 2022-09-22

## 2023-03-18 ENCOUNTER — APPOINTMENT (OUTPATIENT)
Dept: RADIOLOGY | Facility: MEDICAL CENTER | Age: 78
DRG: 514 | End: 2023-03-18
Attending: EMERGENCY MEDICINE
Payer: MEDICARE

## 2023-03-18 ENCOUNTER — HOSPITAL ENCOUNTER (INPATIENT)
Facility: MEDICAL CENTER | Age: 78
LOS: 2 days | DRG: 514 | End: 2023-03-21
Attending: EMERGENCY MEDICINE | Admitting: STUDENT IN AN ORGANIZED HEALTH CARE EDUCATION/TRAINING PROGRAM
Payer: MEDICARE

## 2023-03-18 DIAGNOSIS — S61.452A DOG BITE, HAND, LEFT, INITIAL ENCOUNTER: ICD-10-CM
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
## 2023-03-18 DIAGNOSIS — S62.333B: ICD-10-CM

## 2023-03-18 DIAGNOSIS — T14.8XXA SOFT TISSUE AVULSION: ICD-10-CM

## 2023-03-18 DIAGNOSIS — K59.00 CONSTIPATION, UNSPECIFIED CONSTIPATION TYPE: ICD-10-CM

## 2023-03-18 DIAGNOSIS — S61.459A DOG BITE OF HAND, INITIAL ENCOUNTER: ICD-10-CM

## 2023-03-18 DIAGNOSIS — S61.452S: ICD-10-CM

## 2023-03-18 LAB
ALBUMIN SERPL BCP-MCNC: 4 G/DL (ref 3.2–4.9)
ALBUMIN/GLOB SERPL: 1.6 G/DL
ALP SERPL-CCNC: 78 U/L (ref 30–99)
ALT SERPL-CCNC: 18 U/L (ref 2–50)
ANION GAP SERPL CALC-SCNC: 16 MMOL/L (ref 7–16)
APTT PPP: 29.4 SEC (ref 24.7–36)
AST SERPL-CCNC: 23 U/L (ref 12–45)
BASOPHILS # BLD AUTO: 0.4 % (ref 0–1.8)
BASOPHILS # BLD: 0.04 K/UL (ref 0–0.12)
BILIRUB SERPL-MCNC: 0.3 MG/DL (ref 0.1–1.5)
BUN SERPL-MCNC: 18 MG/DL (ref 8–22)
CALCIUM ALBUM COR SERPL-MCNC: 8.9 MG/DL (ref 8.5–10.5)
CALCIUM SERPL-MCNC: 8.9 MG/DL (ref 8.5–10.5)
CHLORIDE SERPL-SCNC: 101 MMOL/L (ref 96–112)
CO2 SERPL-SCNC: 23 MMOL/L (ref 20–33)
CREAT SERPL-MCNC: 0.58 MG/DL (ref 0.5–1.4)
EOSINOPHIL # BLD AUTO: 0.03 K/UL (ref 0–0.51)
EOSINOPHIL NFR BLD: 0.3 % (ref 0–6.9)
ERYTHROCYTE [DISTWIDTH] IN BLOOD BY AUTOMATED COUNT: 41.2 FL (ref 35.9–50)
GFR SERPLBLD CREATININE-BSD FMLA CKD-EPI: 92 ML/MIN/1.73 M 2
GLOBULIN SER CALC-MCNC: 2.5 G/DL (ref 1.9–3.5)
GLUCOSE SERPL-MCNC: 121 MG/DL (ref 65–99)
HCT VFR BLD AUTO: 41.3 % (ref 37–47)
HGB BLD-MCNC: 13.5 G/DL (ref 12–16)
IMM GRANULOCYTES # BLD AUTO: 0.06 K/UL (ref 0–0.11)
IMM GRANULOCYTES NFR BLD AUTO: 0.6 % (ref 0–0.9)
INR PPP: 1.03 (ref 0.87–1.13)
LYMPHOCYTES # BLD AUTO: 1.41 K/UL (ref 1–4.8)
LYMPHOCYTES NFR BLD: 13.7 % (ref 22–41)
MCH RBC QN AUTO: 29 PG (ref 27–33)
MCHC RBC AUTO-ENTMCNC: 32.7 G/DL (ref 33.6–35)
MCV RBC AUTO: 88.8 FL (ref 81.4–97.8)
MONOCYTES # BLD AUTO: 0.77 K/UL (ref 0–0.85)
MONOCYTES NFR BLD AUTO: 7.5 % (ref 0–13.4)
NEUTROPHILS # BLD AUTO: 7.98 K/UL (ref 2–7.15)
NEUTROPHILS NFR BLD: 77.5 % (ref 44–72)
NRBC # BLD AUTO: 0 K/UL
NRBC BLD-RTO: 0 /100 WBC
PLATELET # BLD AUTO: 387 K/UL (ref 164–446)
PMV BLD AUTO: 9.5 FL (ref 9–12.9)
POTASSIUM SERPL-SCNC: 2.8 MMOL/L (ref 3.6–5.5)
PROT SERPL-MCNC: 6.5 G/DL (ref 6–8.2)
PROTHROMBIN TIME: 13.4 SEC (ref 12–14.6)
RBC # BLD AUTO: 4.65 M/UL (ref 4.2–5.4)
SODIUM SERPL-SCNC: 140 MMOL/L (ref 135–145)
WBC # BLD AUTO: 10.3 K/UL (ref 4.8–10.8)

## 2023-03-18 PROCEDURE — 700105 HCHG RX REV CODE 258: Performed by: EMERGENCY MEDICINE

## 2023-03-18 PROCEDURE — 85025 COMPLETE CBC W/AUTO DIFF WBC: CPT

## 2023-03-18 PROCEDURE — 94760 N-INVAS EAR/PLS OXIMETRY 1: CPT

## 2023-03-18 PROCEDURE — 700105 HCHG RX REV CODE 258: Performed by: STUDENT IN AN ORGANIZED HEALTH CARE EDUCATION/TRAINING PROGRAM

## 2023-03-18 PROCEDURE — 304217 HCHG IRRIGATION SYSTEM

## 2023-03-18 PROCEDURE — 71045 X-RAY EXAM CHEST 1 VIEW: CPT

## 2023-03-18 PROCEDURE — 99285 EMERGENCY DEPT VISIT HI MDM: CPT

## 2023-03-18 PROCEDURE — 99221 1ST HOSP IP/OBS SF/LOW 40: CPT | Mod: AI | Performed by: STUDENT IN AN ORGANIZED HEALTH CARE EDUCATION/TRAINING PROGRAM

## 2023-03-18 PROCEDURE — 700111 HCHG RX REV CODE 636 W/ 250 OVERRIDE (IP): Performed by: STUDENT IN AN ORGANIZED HEALTH CARE EDUCATION/TRAINING PROGRAM

## 2023-03-18 PROCEDURE — 93005 ELECTROCARDIOGRAM TRACING: CPT | Performed by: EMERGENCY MEDICINE

## 2023-03-18 PROCEDURE — 96366 THER/PROPH/DIAG IV INF ADDON: CPT

## 2023-03-18 PROCEDURE — 85610 PROTHROMBIN TIME: CPT

## 2023-03-18 PROCEDURE — 700101 HCHG RX REV CODE 250: Performed by: STUDENT IN AN ORGANIZED HEALTH CARE EDUCATION/TRAINING PROGRAM

## 2023-03-18 PROCEDURE — 96375 TX/PRO/DX INJ NEW DRUG ADDON: CPT

## 2023-03-18 PROCEDURE — G0378 HOSPITAL OBSERVATION PER HR: HCPCS

## 2023-03-18 PROCEDURE — 700102 HCHG RX REV CODE 250 W/ 637 OVERRIDE(OP): Performed by: STUDENT IN AN ORGANIZED HEALTH CARE EDUCATION/TRAINING PROGRAM

## 2023-03-18 PROCEDURE — 73130 X-RAY EXAM OF HAND: CPT | Mod: LT

## 2023-03-18 PROCEDURE — 36415 COLL VENOUS BLD VENIPUNCTURE: CPT

## 2023-03-18 PROCEDURE — 99222 1ST HOSP IP/OBS MODERATE 55: CPT | Mod: 57 | Performed by: STUDENT IN AN ORGANIZED HEALTH CARE EDUCATION/TRAINING PROGRAM

## 2023-03-18 PROCEDURE — 80053 COMPREHEN METABOLIC PANEL: CPT

## 2023-03-18 PROCEDURE — A9270 NON-COVERED ITEM OR SERVICE: HCPCS | Performed by: STUDENT IN AN ORGANIZED HEALTH CARE EDUCATION/TRAINING PROGRAM

## 2023-03-18 PROCEDURE — 96365 THER/PROPH/DIAG IV INF INIT: CPT

## 2023-03-18 PROCEDURE — 85730 THROMBOPLASTIN TIME PARTIAL: CPT

## 2023-03-18 PROCEDURE — 700111 HCHG RX REV CODE 636 W/ 250 OVERRIDE (IP): Performed by: EMERGENCY MEDICINE

## 2023-03-18 RX ORDER — LIOTHYRONINE SODIUM 25 UG/1
40 TABLET ORAL DAILY
Status: DISCONTINUED | OUTPATIENT
Start: 2023-03-19 | End: 2023-03-21 | Stop reason: HOSPADM

## 2023-03-18 RX ORDER — ONDANSETRON 2 MG/ML
4 INJECTION INTRAMUSCULAR; INTRAVENOUS ONCE
Status: DISPENSED | OUTPATIENT
Start: 2023-03-18 | End: 2023-03-19

## 2023-03-18 RX ORDER — LEVOTHYROXINE SODIUM 125 UG/1
125 TABLET ORAL
COMMUNITY

## 2023-03-18 RX ORDER — LIOTHYRONINE SODIUM 5 UG/1
15 TABLET ORAL DAILY
Status: DISCONTINUED | OUTPATIENT
Start: 2023-03-19 | End: 2023-03-18

## 2023-03-18 RX ORDER — SODIUM CHLORIDE AND POTASSIUM CHLORIDE 150; 900 MG/100ML; MG/100ML
INJECTION, SOLUTION INTRAVENOUS CONTINUOUS
Status: DISCONTINUED | OUTPATIENT
Start: 2023-03-18 | End: 2023-03-20

## 2023-03-18 RX ORDER — DULOXETIN HYDROCHLORIDE 30 MG/1
30 CAPSULE, DELAYED RELEASE ORAL EVERY EVENING
Status: DISCONTINUED | OUTPATIENT
Start: 2023-03-18 | End: 2023-03-21 | Stop reason: HOSPADM

## 2023-03-18 RX ORDER — BISACODYL 10 MG
10 SUPPOSITORY, RECTAL RECTAL
Status: DISCONTINUED | OUTPATIENT
Start: 2023-03-18 | End: 2023-03-21 | Stop reason: HOSPADM

## 2023-03-18 RX ORDER — SODIUM CHLORIDE, SODIUM LACTATE, POTASSIUM CHLORIDE, CALCIUM CHLORIDE 600; 310; 30; 20 MG/100ML; MG/100ML; MG/100ML; MG/100ML
INJECTION, SOLUTION INTRAVENOUS CONTINUOUS
Status: DISCONTINUED | OUTPATIENT
Start: 2023-03-18 | End: 2023-03-18

## 2023-03-18 RX ORDER — ACETAMINOPHEN 325 MG/1
650 TABLET ORAL EVERY 6 HOURS PRN
Status: DISCONTINUED | OUTPATIENT
Start: 2023-03-18 | End: 2023-03-21 | Stop reason: HOSPADM

## 2023-03-18 RX ORDER — KETOROLAC TROMETHAMINE 30 MG/ML
15 INJECTION, SOLUTION INTRAMUSCULAR; INTRAVENOUS EVERY 6 HOURS PRN
Status: DISCONTINUED | OUTPATIENT
Start: 2023-03-18 | End: 2023-03-18

## 2023-03-18 RX ORDER — POLYETHYLENE GLYCOL 3350 17 G/17G
1 POWDER, FOR SOLUTION ORAL
Status: DISCONTINUED | OUTPATIENT
Start: 2023-03-18 | End: 2023-03-21 | Stop reason: HOSPADM

## 2023-03-18 RX ORDER — LEVOTHYROXINE SODIUM 125 UG/1
125 TABLET ORAL
Status: DISCONTINUED | OUTPATIENT
Start: 2023-03-19 | End: 2023-03-21 | Stop reason: HOSPADM

## 2023-03-18 RX ORDER — AMOXICILLIN 250 MG
2 CAPSULE ORAL 2 TIMES DAILY
Status: DISCONTINUED | OUTPATIENT
Start: 2023-03-18 | End: 2023-03-21 | Stop reason: HOSPADM

## 2023-03-18 RX ORDER — HYDROMORPHONE HYDROCHLORIDE 1 MG/ML
0.5 INJECTION, SOLUTION INTRAMUSCULAR; INTRAVENOUS; SUBCUTANEOUS EVERY 4 HOURS PRN
Status: DISCONTINUED | OUTPATIENT
Start: 2023-03-18 | End: 2023-03-21 | Stop reason: HOSPADM

## 2023-03-18 RX ORDER — HYDRALAZINE HYDROCHLORIDE 20 MG/ML
10 INJECTION INTRAMUSCULAR; INTRAVENOUS EVERY 4 HOURS PRN
Status: DISCONTINUED | OUTPATIENT
Start: 2023-03-18 | End: 2023-03-21 | Stop reason: HOSPADM

## 2023-03-18 RX ADMIN — DULOXETINE HYDROCHLORIDE 30 MG: 30 CAPSULE, DELAYED RELEASE ORAL at 21:26

## 2023-03-18 RX ADMIN — POTASSIUM CHLORIDE AND SODIUM CHLORIDE: 900; 150 INJECTION, SOLUTION INTRAVENOUS at 21:25

## 2023-03-18 RX ADMIN — AMPICILLIN AND SULBACTAM 3 G: 1; 2 INJECTION, POWDER, FOR SOLUTION INTRAMUSCULAR; INTRAVENOUS at 23:56

## 2023-03-18 RX ADMIN — ACETAMINOPHEN 650 MG: 325 TABLET, FILM COATED ORAL at 23:58

## 2023-03-18 RX ADMIN — AMPICILLIN AND SULBACTAM 3 G: 1; 2 INJECTION, POWDER, FOR SOLUTION INTRAMUSCULAR; INTRAVENOUS at 17:45

## 2023-03-18 RX ADMIN — HYDROMORPHONE HYDROCHLORIDE 0.5 MG: 1 INJECTION, SOLUTION INTRAMUSCULAR; INTRAVENOUS; SUBCUTANEOUS at 19:16

## 2023-03-18 ASSESSMENT — ENCOUNTER SYMPTOMS
FALLS: 0
SHORTNESS OF BREATH: 0
EYE PAIN: 0
CHILLS: 0
MYALGIAS: 1
FEVER: 0
HEADACHES: 0
PALPITATIONS: 0
LOSS OF CONSCIOUSNESS: 0
DIZZINESS: 0
VOMITING: 0
BLURRED VISION: 0
BACK PAIN: 0
COUGH: 0
FOCAL WEAKNESS: 0
SENSORY CHANGE: 0
INSOMNIA: 0
NAUSEA: 0
ABDOMINAL PAIN: 0
BRUISES/BLEEDS EASILY: 1

## 2023-03-18 ASSESSMENT — LIFESTYLE VARIABLES
ALCOHOL_USE: NO
HAVE YOU EVER FELT YOU SHOULD CUT DOWN ON YOUR DRINKING: NO
AVERAGE NUMBER OF DAYS PER WEEK YOU HAVE A DRINK CONTAINING ALCOHOL: 0
HOW MANY TIMES IN THE PAST YEAR HAVE YOU HAD 5 OR MORE DRINKS IN A DAY: 0
TOTAL SCORE: 0
HAVE PEOPLE ANNOYED YOU BY CRITICIZING YOUR DRINKING: NO
DOES PATIENT WANT TO STOP DRINKING: CANNOT ASSESS
ON A TYPICAL DAY WHEN YOU DRINK ALCOHOL HOW MANY DRINKS DO YOU HAVE: 0
EVER FELT BAD OR GUILTY ABOUT YOUR DRINKING: NO
SUBSTANCE_ABUSE: 0
TOTAL SCORE: 0
EVER HAD A DRINK FIRST THING IN THE MORNING TO STEADY YOUR NERVES TO GET RID OF A HANGOVER: NO
TOTAL SCORE: 0
CONSUMPTION TOTAL: NEGATIVE

## 2023-03-18 ASSESSMENT — PATIENT HEALTH QUESTIONNAIRE - PHQ9
1. LITTLE INTEREST OR PLEASURE IN DOING THINGS: NOT AT ALL
2. FEELING DOWN, DEPRESSED, IRRITABLE, OR HOPELESS: NOT AT ALL
SUM OF ALL RESPONSES TO PHQ9 QUESTIONS 1 AND 2: 0

## 2023-03-18 ASSESSMENT — PAIN DESCRIPTION - PAIN TYPE
TYPE: ACUTE PAIN

## 2023-03-18 ASSESSMENT — FIBROSIS 4 INDEX: FIB4 SCORE: 1.09

## 2023-03-19 ENCOUNTER — APPOINTMENT (OUTPATIENT)
Dept: RADIOLOGY | Facility: MEDICAL CENTER | Age: 78
DRG: 514 | End: 2023-03-19
Attending: STUDENT IN AN ORGANIZED HEALTH CARE EDUCATION/TRAINING PROGRAM
Payer: MEDICARE

## 2023-03-19 ENCOUNTER — ANESTHESIA (OUTPATIENT)
Dept: SURGERY | Facility: MEDICAL CENTER | Age: 78
DRG: 514 | End: 2023-03-19
Payer: MEDICARE

## 2023-03-19 ENCOUNTER — ANESTHESIA EVENT (OUTPATIENT)
Dept: SURGERY | Facility: MEDICAL CENTER | Age: 78
DRG: 514 | End: 2023-03-19
Payer: MEDICARE

## 2023-03-19 PROBLEM — S61.452S: Status: ACTIVE | Noted: 2023-03-19

## 2023-03-19 PROBLEM — Z71.89 ADVANCE CARE PLANNING: Status: ACTIVE | Noted: 2023-03-19
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
## 2023-03-19 PROBLEM — E87.6 HYPOKALEMIA: Status: ACTIVE | Noted: 2023-03-19

## 2023-03-19 LAB
ANION GAP SERPL CALC-SCNC: 11 MMOL/L (ref 7–16)
BUN SERPL-MCNC: 12 MG/DL (ref 8–22)
CALCIUM SERPL-MCNC: 7.9 MG/DL (ref 8.5–10.5)
CHLORIDE SERPL-SCNC: 102 MMOL/L (ref 96–112)
CO2 SERPL-SCNC: 26 MMOL/L (ref 20–33)
CREAT SERPL-MCNC: 0.45 MG/DL (ref 0.5–1.4)
EKG IMPRESSION: NORMAL
ERYTHROCYTE [DISTWIDTH] IN BLOOD BY AUTOMATED COUNT: 41.1 FL (ref 35.9–50)
GFR SERPLBLD CREATININE-BSD FMLA CKD-EPI: 98 ML/MIN/1.73 M 2
GLUCOSE SERPL-MCNC: 109 MG/DL (ref 65–99)
HCT VFR BLD AUTO: 35.2 % (ref 37–47)
HGB BLD-MCNC: 11.9 G/DL (ref 12–16)
MAGNESIUM SERPL-MCNC: 1.9 MG/DL (ref 1.5–2.5)
MCH RBC QN AUTO: 29.5 PG (ref 27–33)
MCHC RBC AUTO-ENTMCNC: 33.8 G/DL (ref 33.6–35)
MCV RBC AUTO: 87.1 FL (ref 81.4–97.8)
PHOSPHATE SERPL-MCNC: 2.6 MG/DL (ref 2.5–4.5)
PLATELET # BLD AUTO: 316 K/UL (ref 164–446)
PMV BLD AUTO: 9.9 FL (ref 9–12.9)
POTASSIUM SERPL-SCNC: 2.9 MMOL/L (ref 3.6–5.5)
RBC # BLD AUTO: 4.04 M/UL (ref 4.2–5.4)
SCCMEC + MECA PNL NOSE NAA+PROBE: NEGATIVE
SODIUM SERPL-SCNC: 139 MMOL/L (ref 135–145)
WBC # BLD AUTO: 8.5 K/UL (ref 4.8–10.8)

## 2023-03-19 PROCEDURE — A9270 NON-COVERED ITEM OR SERVICE: HCPCS | Performed by: HOSPITALIST

## 2023-03-19 PROCEDURE — 770001 HCHG ROOM/CARE - MED/SURG/GYN PRIV*

## 2023-03-19 PROCEDURE — A9270 NON-COVERED ITEM OR SERVICE: HCPCS | Performed by: STUDENT IN AN ORGANIZED HEALTH CARE EDUCATION/TRAINING PROGRAM

## 2023-03-19 PROCEDURE — 96366 THER/PROPH/DIAG IV INF ADDON: CPT

## 2023-03-19 PROCEDURE — 13120 CMPLX RPR S/A/L 1.1-2.5 CM: CPT | Mod: 59,LT | Performed by: STUDENT IN AN ORGANIZED HEALTH CARE EDUCATION/TRAINING PROGRAM

## 2023-03-19 PROCEDURE — 80048 BASIC METABOLIC PNL TOTAL CA: CPT

## 2023-03-19 PROCEDURE — 160002 HCHG RECOVERY MINUTES (STAT): Performed by: STUDENT IN AN ORGANIZED HEALTH CARE EDUCATION/TRAINING PROGRAM

## 2023-03-19 PROCEDURE — 84100 ASSAY OF PHOSPHORUS: CPT

## 2023-03-19 PROCEDURE — 700111 HCHG RX REV CODE 636 W/ 250 OVERRIDE (IP): Performed by: STUDENT IN AN ORGANIZED HEALTH CARE EDUCATION/TRAINING PROGRAM

## 2023-03-19 PROCEDURE — 160028 HCHG SURGERY MINUTES - 1ST 30 MINS LEVEL 3: Performed by: STUDENT IN AN ORGANIZED HEALTH CARE EDUCATION/TRAINING PROGRAM

## 2023-03-19 PROCEDURE — 700101 HCHG RX REV CODE 250: Performed by: STUDENT IN AN ORGANIZED HEALTH CARE EDUCATION/TRAINING PROGRAM

## 2023-03-19 PROCEDURE — 85027 COMPLETE CBC AUTOMATED: CPT

## 2023-03-19 PROCEDURE — 97602 WOUND(S) CARE NON-SELECTIVE: CPT

## 2023-03-19 PROCEDURE — 700102 HCHG RX REV CODE 250 W/ 637 OVERRIDE(OP): Performed by: HOSPITALIST

## 2023-03-19 PROCEDURE — 83735 ASSAY OF MAGNESIUM: CPT

## 2023-03-19 PROCEDURE — 160035 HCHG PACU - 1ST 60 MINS PHASE I: Performed by: STUDENT IN AN ORGANIZED HEALTH CARE EDUCATION/TRAINING PROGRAM

## 2023-03-19 PROCEDURE — 01830 ANES ARTHR/NDSC WRST/HND NOS: CPT | Performed by: ANESTHESIOLOGY

## 2023-03-19 PROCEDURE — 160036 HCHG PACU - EA ADDL 30 MINS PHASE I: Performed by: STUDENT IN AN ORGANIZED HEALTH CARE EDUCATION/TRAINING PROGRAM

## 2023-03-19 PROCEDURE — 700105 HCHG RX REV CODE 258: Performed by: ANESTHESIOLOGY

## 2023-03-19 PROCEDURE — 700101 HCHG RX REV CODE 250: Performed by: ANESTHESIOLOGY

## 2023-03-19 PROCEDURE — 700111 HCHG RX REV CODE 636 W/ 250 OVERRIDE (IP): Performed by: ANESTHESIOLOGY

## 2023-03-19 PROCEDURE — 0PSQ34Z REPOSITION LEFT METACARPAL WITH INTERNAL FIXATION DEVICE, PERCUTANEOUS APPROACH: ICD-10-PCS | Performed by: STUDENT IN AN ORGANIZED HEALTH CARE EDUCATION/TRAINING PROGRAM

## 2023-03-19 PROCEDURE — 99233 SBSQ HOSP IP/OBS HIGH 50: CPT | Mod: 25 | Performed by: HOSPITALIST

## 2023-03-19 PROCEDURE — 73130 X-RAY EXAM OF HAND: CPT | Mod: LT

## 2023-03-19 PROCEDURE — 160048 HCHG OR STATISTICAL LEVEL 1-5: Performed by: STUDENT IN AN ORGANIZED HEALTH CARE EDUCATION/TRAINING PROGRAM

## 2023-03-19 PROCEDURE — 99100 ANES PT EXTEME AGE<1 YR&>70: CPT | Performed by: ANESTHESIOLOGY

## 2023-03-19 PROCEDURE — 700102 HCHG RX REV CODE 250 W/ 637 OVERRIDE(OP): Performed by: STUDENT IN AN ORGANIZED HEALTH CARE EDUCATION/TRAINING PROGRAM

## 2023-03-19 PROCEDURE — C1713 ANCHOR/SCREW BN/BN,TIS/BN: HCPCS | Performed by: STUDENT IN AN ORGANIZED HEALTH CARE EDUCATION/TRAINING PROGRAM

## 2023-03-19 PROCEDURE — 87641 MR-STAPH DNA AMP PROBE: CPT

## 2023-03-19 PROCEDURE — 36415 COLL VENOUS BLD VENIPUNCTURE: CPT

## 2023-03-19 PROCEDURE — 160009 HCHG ANES TIME/MIN: Performed by: STUDENT IN AN ORGANIZED HEALTH CARE EDUCATION/TRAINING PROGRAM

## 2023-03-19 PROCEDURE — 160039 HCHG SURGERY MINUTES - EA ADDL 1 MIN LEVEL 3: Performed by: STUDENT IN AN ORGANIZED HEALTH CARE EDUCATION/TRAINING PROGRAM

## 2023-03-19 PROCEDURE — 700111 HCHG RX REV CODE 636 W/ 250 OVERRIDE (IP): Performed by: HOSPITALIST

## 2023-03-19 PROCEDURE — 93010 ELECTROCARDIOGRAM REPORT: CPT | Performed by: INTERNAL MEDICINE

## 2023-03-19 PROCEDURE — 26608 TREAT METACARPAL FRACTURE: CPT | Mod: T3 | Performed by: STUDENT IN AN ORGANIZED HEALTH CARE EDUCATION/TRAINING PROGRAM

## 2023-03-19 PROCEDURE — 700105 HCHG RX REV CODE 258: Performed by: STUDENT IN AN ORGANIZED HEALTH CARE EDUCATION/TRAINING PROGRAM

## 2023-03-19 PROCEDURE — 99497 ADVNCD CARE PLAN 30 MIN: CPT | Performed by: HOSPITALIST

## 2023-03-19 DEVICE — WIRE K- SMOOTH .045 - (3TX6=18): Type: IMPLANTABLE DEVICE | Status: FUNCTIONAL

## 2023-03-19 RX ORDER — BUPIVACAINE HYDROCHLORIDE 5 MG/ML
INJECTION, SOLUTION EPIDURAL; INTRACAUDAL
Status: DISCONTINUED | OUTPATIENT
Start: 2023-03-19 | End: 2023-03-19 | Stop reason: HOSPADM

## 2023-03-19 RX ORDER — HYDROMORPHONE HYDROCHLORIDE 1 MG/ML
0.5 INJECTION, SOLUTION INTRAMUSCULAR; INTRAVENOUS; SUBCUTANEOUS
Status: DISCONTINUED | OUTPATIENT
Start: 2023-03-19 | End: 2023-03-19 | Stop reason: HOSPADM

## 2023-03-19 RX ORDER — EPHEDRINE SULFATE 50 MG/ML
INJECTION, SOLUTION INTRAVENOUS PRN
Status: DISCONTINUED | OUTPATIENT
Start: 2023-03-19 | End: 2023-03-19 | Stop reason: SURG

## 2023-03-19 RX ORDER — HYDROMORPHONE HYDROCHLORIDE 2 MG/ML
INJECTION, SOLUTION INTRAMUSCULAR; INTRAVENOUS; SUBCUTANEOUS PRN
Status: DISCONTINUED | OUTPATIENT
Start: 2023-03-19 | End: 2023-03-19 | Stop reason: SURG

## 2023-03-19 RX ORDER — POTASSIUM CHLORIDE 1500 MG/1
40 TABLET, EXTENDED RELEASE ORAL ONCE
Status: COMPLETED | OUTPATIENT
Start: 2023-03-19 | End: 2023-03-19

## 2023-03-19 RX ORDER — SODIUM CHLORIDE, SODIUM LACTATE, POTASSIUM CHLORIDE, CALCIUM CHLORIDE 600; 310; 30; 20 MG/100ML; MG/100ML; MG/100ML; MG/100ML
INJECTION, SOLUTION INTRAVENOUS
Status: DISCONTINUED | OUTPATIENT
Start: 2023-03-19 | End: 2023-03-19 | Stop reason: SURG

## 2023-03-19 RX ORDER — HYDROMORPHONE HYDROCHLORIDE 1 MG/ML
0.4 INJECTION, SOLUTION INTRAMUSCULAR; INTRAVENOUS; SUBCUTANEOUS
Status: DISCONTINUED | OUTPATIENT
Start: 2023-03-19 | End: 2023-03-19 | Stop reason: HOSPADM

## 2023-03-19 RX ORDER — ALBUTEROL SULFATE 5 MG/ML
2.5 SOLUTION RESPIRATORY (INHALATION)
Status: DISCONTINUED | OUTPATIENT
Start: 2023-03-19 | End: 2023-03-19 | Stop reason: HOSPADM

## 2023-03-19 RX ORDER — SODIUM CHLORIDE, SODIUM LACTATE, POTASSIUM CHLORIDE, CALCIUM CHLORIDE 600; 310; 30; 20 MG/100ML; MG/100ML; MG/100ML; MG/100ML
INJECTION, SOLUTION INTRAVENOUS CONTINUOUS
Status: DISCONTINUED | OUTPATIENT
Start: 2023-03-19 | End: 2023-03-19 | Stop reason: HOSPADM

## 2023-03-19 RX ORDER — DEXAMETHASONE SODIUM PHOSPHATE 4 MG/ML
INJECTION, SOLUTION INTRA-ARTICULAR; INTRALESIONAL; INTRAMUSCULAR; INTRAVENOUS; SOFT TISSUE PRN
Status: DISCONTINUED | OUTPATIENT
Start: 2023-03-19 | End: 2023-03-19 | Stop reason: SURG

## 2023-03-19 RX ORDER — MAGNESIUM HYDROXIDE 1200 MG/15ML
LIQUID ORAL
Status: COMPLETED | OUTPATIENT
Start: 2023-03-19 | End: 2023-03-19

## 2023-03-19 RX ORDER — ONDANSETRON 2 MG/ML
4 INJECTION INTRAMUSCULAR; INTRAVENOUS
Status: DISCONTINUED | OUTPATIENT
Start: 2023-03-19 | End: 2023-03-19 | Stop reason: HOSPADM

## 2023-03-19 RX ORDER — HYDRALAZINE HYDROCHLORIDE 20 MG/ML
5 INJECTION INTRAMUSCULAR; INTRAVENOUS
Status: DISCONTINUED | OUTPATIENT
Start: 2023-03-19 | End: 2023-03-19 | Stop reason: HOSPADM

## 2023-03-19 RX ORDER — HALOPERIDOL 5 MG/ML
1 INJECTION INTRAMUSCULAR
Status: DISCONTINUED | OUTPATIENT
Start: 2023-03-19 | End: 2023-03-19 | Stop reason: HOSPADM

## 2023-03-19 RX ORDER — HYDROMORPHONE HYDROCHLORIDE 1 MG/ML
0.2 INJECTION, SOLUTION INTRAMUSCULAR; INTRAVENOUS; SUBCUTANEOUS
Status: DISCONTINUED | OUTPATIENT
Start: 2023-03-19 | End: 2023-03-19 | Stop reason: HOSPADM

## 2023-03-19 RX ORDER — HYDROMORPHONE HYDROCHLORIDE 2 MG/1
2 TABLET ORAL
Status: DISCONTINUED | OUTPATIENT
Start: 2023-03-19 | End: 2023-03-19 | Stop reason: HOSPADM

## 2023-03-19 RX ORDER — LIDOCAINE HYDROCHLORIDE 20 MG/ML
INJECTION, SOLUTION EPIDURAL; INFILTRATION; INTRACAUDAL; PERINEURAL PRN
Status: DISCONTINUED | OUTPATIENT
Start: 2023-03-19 | End: 2023-03-19 | Stop reason: SURG

## 2023-03-19 RX ORDER — AMPICILLIN AND SULBACTAM 2; 1 G/1; G/1
INJECTION, POWDER, FOR SOLUTION INTRAMUSCULAR; INTRAVENOUS PRN
Status: DISCONTINUED | OUTPATIENT
Start: 2023-03-19 | End: 2023-03-19 | Stop reason: SURG

## 2023-03-19 RX ORDER — MAGNESIUM SULFATE HEPTAHYDRATE 40 MG/ML
2 INJECTION, SOLUTION INTRAVENOUS ONCE
Status: COMPLETED | OUTPATIENT
Start: 2023-03-19 | End: 2023-03-19

## 2023-03-19 RX ORDER — ONDANSETRON 2 MG/ML
INJECTION INTRAMUSCULAR; INTRAVENOUS PRN
Status: DISCONTINUED | OUTPATIENT
Start: 2023-03-19 | End: 2023-03-19 | Stop reason: SURG

## 2023-03-19 RX ADMIN — AMPICILLIN AND SULBACTAM 3 G: 1; 2 INJECTION, POWDER, FOR SOLUTION INTRAMUSCULAR; INTRAVENOUS at 05:45

## 2023-03-19 RX ADMIN — LEVOTHYROXINE SODIUM 125 MCG: 0.12 TABLET ORAL at 05:46

## 2023-03-19 RX ADMIN — LIOTHYRONINE SODIUM 37.5 MCG: 25 TABLET ORAL at 05:45

## 2023-03-19 RX ADMIN — PROPOFOL 80 MG: 10 INJECTION, EMULSION INTRAVENOUS at 11:25

## 2023-03-19 RX ADMIN — POTASSIUM CHLORIDE 40 MEQ: 1500 TABLET, EXTENDED RELEASE ORAL at 14:14

## 2023-03-19 RX ADMIN — HYDROMORPHONE HYDROCHLORIDE 0.5 MG: 2 INJECTION INTRAMUSCULAR; INTRAVENOUS; SUBCUTANEOUS at 11:40

## 2023-03-19 RX ADMIN — AMPICILLIN AND SULBACTAM 3 G: 1; 2 INJECTION, POWDER, FOR SOLUTION INTRAMUSCULAR; INTRAVENOUS at 14:17

## 2023-03-19 RX ADMIN — ONDANSETRON 4 MG: 2 INJECTION INTRAMUSCULAR; INTRAVENOUS at 12:31

## 2023-03-19 RX ADMIN — DEXAMETHASONE SODIUM PHOSPHATE 8 MG: 4 INJECTION, SOLUTION INTRA-ARTICULAR; INTRALESIONAL; INTRAMUSCULAR; INTRAVENOUS; SOFT TISSUE at 11:28

## 2023-03-19 RX ADMIN — DULOXETINE HYDROCHLORIDE 30 MG: 30 CAPSULE, DELAYED RELEASE ORAL at 16:50

## 2023-03-19 RX ADMIN — EPHEDRINE SULFATE 10 MG: 50 INJECTION, SOLUTION INTRAVENOUS at 11:32

## 2023-03-19 RX ADMIN — AMPICILLIN AND SULBACTAM 3 G: 1; 2 INJECTION, POWDER, FOR SOLUTION INTRAMUSCULAR; INTRAVENOUS at 19:39

## 2023-03-19 RX ADMIN — POTASSIUM CHLORIDE AND SODIUM CHLORIDE: 900; 150 INJECTION, SOLUTION INTRAVENOUS at 16:48

## 2023-03-19 RX ADMIN — MAGNESIUM SULFATE HEPTAHYDRATE 2 G: 40 INJECTION, SOLUTION INTRAVENOUS at 16:00

## 2023-03-19 RX ADMIN — FENTANYL CITRATE 100 MCG: 50 INJECTION, SOLUTION INTRAMUSCULAR; INTRAVENOUS at 11:25

## 2023-03-19 RX ADMIN — LIDOCAINE HYDROCHLORIDE 40 MG: 20 INJECTION, SOLUTION EPIDURAL; INFILTRATION; INTRACAUDAL at 11:25

## 2023-03-19 RX ADMIN — POTASSIUM CHLORIDE 40 MEQ: 1500 TABLET, EXTENDED RELEASE ORAL at 08:18

## 2023-03-19 RX ADMIN — SODIUM CHLORIDE, POTASSIUM CHLORIDE, SODIUM LACTATE AND CALCIUM CHLORIDE: 600; 310; 30; 20 INJECTION, SOLUTION INTRAVENOUS at 11:22

## 2023-03-19 RX ADMIN — AMPICILLIN AND SULBACTAM 3 G: 1; 2 INJECTION, POWDER, FOR SOLUTION INTRAMUSCULAR; INTRAVENOUS at 11:22

## 2023-03-19 ASSESSMENT — ENCOUNTER SYMPTOMS
FOCAL WEAKNESS: 0
TINGLING: 0
NAUSEA: 0
VOMITING: 0
DOUBLE VISION: 0
COUGH: 0
HEARTBURN: 0
FEVER: 0
HEMOPTYSIS: 0
BLURRED VISION: 0
SORE THROAT: 0
NERVOUS/ANXIOUS: 1
PALPITATIONS: 0
ABDOMINAL PAIN: 0

## 2023-03-19 ASSESSMENT — PAIN SCALES - GENERAL: PAIN_LEVEL: 0

## 2023-03-19 ASSESSMENT — PAIN DESCRIPTION - PAIN TYPE
TYPE: ACUTE PAIN
TYPE: ACUTE PAIN

## 2023-03-19 NOTE — ED NOTES
Pt was given PRN dilaudid for wound care    Pt's L hand irrigated, cleaned, and covered with non stick dressings, lightly wrapped with gauze   R arm was also irrigated, cleaned, bacitracin placed, non stick dressings placed and wrapped     Admitting md and surgeon messaged for wound care orders for admitting floor     Pt being taken to T209 by transport, pt's friend is bedside     All pt belongings with pt

## 2023-03-19 NOTE — ANESTHESIA POSTPROCEDURE EVALUATION
Patient: Letitia Ghosh    Procedure Summary     Date: 03/19/23 Room / Location: [place] [address] / SURGERY [facility]    Anesthesia Start: 1122 Anesthesia Stop: 1249    Procedure: LEFT HAND INCISION AND DRAINAGE,  WOUND CLOSURE, AND LEFT HAND METACARPAL PINNING (Left: Hand) Diagnosis: (Open comminuted fracture of the distal LEFT 3rd metacarpal with displacement of the head fragment dorsally and disruption of the joint with adjacent soft tissue gas.)    Surgeons: Tyler Dumont M.D. Responsible Provider: Isauro Huynh M.D.    Anesthesia Type: general ASA Status: 2          Final Anesthesia Type: general  Last vitals  BP   Blood Pressure : 126/60    Temp   36.4 °C (97.5 °F)    Pulse   99   Resp   15    SpO2   95 %      Anesthesia Post Evaluation    Patient location during evaluation: PACU  Patient participation: complete - patient participated  Level of consciousness: awake and alert  Pain score: 0    Airway patency: patent  Anesthetic complications: no  Cardiovascular status: hemodynamically stable  Respiratory status: acceptable  Hydration status: euvolemic    PONV: none          No notable events documented.     Nurse Pain Score: 0 (NPRS)

## 2023-03-19 NOTE — ANESTHESIA PREPROCEDURE EVALUATION
Case: [id number] Date/Time: 03/19/23 1239    Procedures:       INCISION AND DRAINAGE, WOUND, BY ORTHOPEDICS (Left: Hand)      APPLICATION OR REPLACEMENT, WOUND VAC    Anesthesia type: General    Location: [facility] OR  / SURGERY [facility]    Surgeons: Tyler Dumont M.D.        Denies: MI/CHF/smoking/CVA/DM/CKD      Relevant Problems   CARDIAC   (positive) Hot flashes      GI   (positive) Gastroesophageal reflux disease with hiatal hernia      ENDO   (positive) Hypothyroidism   (positive) Hypothyroidism due to acquired atrophy of thyroid       Physical Exam    Airway   Mallampati: II  TM distance: >3 FB  Neck ROM: full       Cardiovascular - normal exam  Rhythm: regular  Rate: normal  (-) murmur     Dental - normal exam           Pulmonary - normal exam  Breath sounds clear to auscultation     Abdominal    Neurological - normal exam                 Anesthesia Plan    ASA 2       Plan - general       Airway plan will be LMA          Induction: intravenous    Postoperative Plan: Postoperative administration of opioids is intended.    Pertinent diagnostic labs and testing reviewed    Informed Consent:    Anesthetic plan and risks discussed with patient.    Use of blood products discussed with: patient whom consented to blood products.

## 2023-03-19 NOTE — WOUND TEAM
[facility]  Inpatient Services  Wound and Skin Care Brief Evaluation    Admission Date: 3/18/2023     Last order of IP CONSULT TO WOUND CARE was found on 3/18/2023 from Hospital Encounter on 3/18/2023     HPI, PMH, SH: Reviewed    Chief Complaint   Patient presents with    Dog Bite     Diagnosis: Dog bite of hand, initial encounter [S61.459A, W54.0XXA]  Dog bite of hand without complication, left, sequela [S61.452S, W54.0XXS]    Unit where seen by Wound Team: T316/02     Wound consult placed regarding right arm and left leg. Chart and images reviewed. This discussed with bedside RN, Fabio. This RN in to assess patient. Pt pleasant and agreeable. Non-selectively debrided with NS/wound cleanser and gauze OR moist warm washcloth and no rinse foam soap. No pressure injuries or advanced wound care needs identified. Partial thickness throughout R arm and L leg, mepitel one applied over tears.  Rolled gauze over R arm and L leg AUNDREA.  Wound consult completed. No further follow up unless indicated and consulted.         RSKIN:   CURRENTLY IN PLACE (X), APPLIED THIS VISIT (A), ORDERED (O):   Q shift Andrew:  X  Q shift pressure point assessments:  X    Surface/Positioning   Standard/Trauma Bed    x      Low Airloss          Bariatric RACHAEL     ICU RACHAEL                              Waffle cushion        Waffle Overlay          Reposition q 2 hours      TAPs Turning system     Z Chivo Pillow     Offloading/Redistribution    Sacral Offloading Dressing (Silicone dressing)     Heel Offloading Dressing (Silicone dressing)         Heel float boots (Prevalon boot)             Float Heels off Bed with Pillows    x       Respiratory NA  Silicone O2 tubing         Gray Foam Ear protectors     Cannula fixation Device (Tender )          High flow offloading Clip    Elastic head band offloading device      Anchorfast                                                         Trach with Optifoam split foam              Containment/Moisture Prevention NA    Rectal tube or BMS    Purwick/Condom Cath        Smith Catheter    Barrier wipes           Barrier paste       Antifungal tx      Interdry        Mobilization NA      Up to chair        Ambulate      PT/OT      Nutrition       Dietician        Diabetes Education      PO  x   TF     TPN     NPO   # days     Other

## 2023-03-19 NOTE — PROGRESS NOTES
Pt arrived to T316 from CDU w/ x1 transport. VSS on RA. Assessment completed. Pt rates pain 4/10, declines interventions. Educated pt on POC, NPO at midnight for sx tomorrow, SCDs on. All questions answered.

## 2023-03-19 NOTE — ED PROVIDER NOTES
ED Provider Note    CHIEF COMPLAINT  Chief Complaint   Patient presents with    Dog Bite       EXTERNAL RECORDS REVIEWED  Outpatient Notes op note 1//22 by Dr. Bailon for left ulnar nerve compression, left carpal tunnel syndrome status post left cubital tunnel release, left carpal tunnel release.    HPI/ROS  LIMITATION TO HISTORY   Select: : None  OUTSIDE HISTORIAN(S):  None    Letitia Ghosh is a 77 y.o. female who presents to the emergency department by ambulance for dog bites.  Patient states her own 2 dogs got into a fight, she tried to break the fight up and was bit by one of her dogs.  Multiple wounds, unknown if bite or scratch, to right dorsal hand, wrist and forearm, left leg.  Patient also with more significant bite wound to the left dorsal hand.  Bleeding controlled with pressure prior to arrival.  Unknown last tetanus.  Burning pain, worse with manipulation, agrees to pain medication, none taken prior to arrival.    No aspirin or blood thinner use.    PAST MEDICAL HISTORY   has a past medical history of Arthritis, Blood clotting disorder ([facility]) (2006), Bowel habit changes, Cancer ([facility]) (1968), Cataract, Fecal incontinence (2016), Heart burn, Hiatus hernia syndrome, Hypothyroid, Indigestion, Osteopenia, Osteoporosis, Pain, Reflux, Right knee pain (08/05/2019), Urinary bladder disorder, and Urinary incontinence.    SURGICAL HISTORY   has a past surgical history that includes bladder suspension (1975); laminotomy (2006 & 2009); arthroplasty (Right, 9/15/15); bowel stimulator insertion (7/27/2016); endoscopy procedure (8/15/2016); other orthopedic surgery (2015); fusion, spine, lumbar, plif (10/3/2016); hardware removal neuro (10/3/2016); lumbar exploration (10/3/2016); abdominal subtotal hysterectomy (1968); salpingectomy (Bilateral, 1978); lumpectomy (Bilateral, 1975); colonoscopy (2010); cataract phaco with iol (Bilateral, 2009); revise knee joint replace,all parts (Right, 8/19/2019); neuroplasty & or  transpos ulnar nerve elbow (Left, 2022); and neuroplasty & or transpos median nrv carpal zhou (Left, 2022).    FAMILY HISTORY  Family History   Problem Relation Age of Onset    Heart Disease Mother     Diabetes Father        SOCIAL HISTORY  Social History     Tobacco Use    Smoking status: Former     Packs/day: 1.00     Years: 25.00     Pack years: 25.00     Types: Cigarettes     Quit date: 1987     Years since quittin.5    Smokeless tobacco: Never   Vaping Use    Vaping Use: Never used   Substance and Sexual Activity    Alcohol use: No    Drug use: No    Sexual activity: Not Currently     Comment: ; Brendan Datt is her POA       CURRENT MEDICATIONS  Home Medications       Reviewed by Niya Hu R.N. (Registered Nurse) on 23 at 1705  Med List Status: Partial     Medication Last Dose Status   acetaminophen (TYLENOL) 325 MG Tab  Active   Ascorbic Acid (VITAMIN C) 1000 MG Tab  Active   Calcium 500-100 MG-UNIT Chew Tab  Active   cephALEXin (KEFLEX) 500 MG Cap  Active   DULoxetine (CYMBALTA) 30 MG Cap DR Particles  Active   Levothyroxine Sodium (SYNTHROID PO)  Active   liothyronine (CYTOMEL) 25 MCG Tab  Active   liothyronine (CYTOMEL) 5 MCG Tab  Active   meloxicam (MOBIC) 15 MG tablet  Active   therapeutic multivitamin-minerals (THERAGRAN-M) Tab  Active                    ALLERGIES  Allergies   Allergen Reactions    Celebrex [Celecoxib] Itching     After one dose      Codeine      Hallucinate      Contrast Media With Iodine [Iodine] Rash     Rash  Pt states she had reaction even after pre-medication protocol    Gadolinium Derivatives Rash     Pt states she turned brilliant red and hot. This was used prior to hip injection.     States she has reaction even after premedication protocol.    Latex Rash     Rash and blisters    Percocet [Oxycodone-Acetaminophen]      Hallucinate    Sulfa Drugs Itching    Tape Rash     Rash and blisters.  Paper tape is OK    Vicodin [Hydrocodone-Acetaminophen]       Hallucinate      Z-Pack [Azithromycin] Vomiting       PHYSICAL EXAM  VITAL SIGNS: BP (!) 146/70   Pulse 90   Temp 37.1 °C (98.7 °F) (Temporal)   Resp 18   Ht 1.524 m (5')   Wt 57.2 kg (126 lb)   SpO2 92%   BMI 24.61 kg/m²   Pulse ox interpretation: I interpret this pulse ox as normal.  Constitutional: Alert in no apparent distress.  HENT: Normocephalic, atraumatic. Bilateral external ears normal, Nose normal.   Eyes: Conjunctiva normal.   Neck: Normal range of motion  Cardiovascular: Normal peripheral perfusion  Thorax & Lungs: Nonlabored respirations  Skin: Warm, Dry  Musculoskeletal: Multiple superficial skin tears dorsal right forearm and hand.  Similar superficial skin tears to left knee and lower leg.  Complex skin avulsion and deep tissue injury to the dorsal left hand with tendon exposure and bony fragments palpated.  Mild oozing of blood, controlled with light pressure.  Palpable radial pulse, less than 2-second capillary refill, sensation intact to light touch at the left fingers.  Flexion and extension intact with discomfort.  Neurologic: Alert and orient x4.  Psychiatric: Affect normal, Judgment normal, Mood normal.       DIAGNOSTIC STUDIES / PROCEDURES  LABS  Results for orders placed or performed during the hospital encounter of 03/18/23   CBC w/ Differential   Result Value Ref Range    WBC 10.3 4.8 - 10.8 K/uL    RBC 4.65 4.20 - 5.40 M/uL    Hemoglobin 13.5 12.0 - 16.0 g/dL    Hematocrit 41.3 37.0 - 47.0 %    MCV 88.8 81.4 - 97.8 fL    MCH 29.0 27.0 - 33.0 pg    MCHC 32.7 (L) 33.6 - 35.0 g/dL    RDW 41.2 35.9 - 50.0 fL    Platelet Count 387 164 - 446 K/uL    MPV 9.5 9.0 - 12.9 fL    Neutrophils-Polys 77.50 (H) 44.00 - 72.00 %    Lymphocytes 13.70 (L) 22.00 - 41.00 %    Monocytes 7.50 0.00 - 13.40 %    Eosinophils 0.30 0.00 - 6.90 %    Basophils 0.40 0.00 - 1.80 %    Immature Granulocytes 0.60 0.00 - 0.90 %    Nucleated RBC 0.00 /100 WBC    Neutrophils (Absolute) 7.98 (H) 2.00 - 7.15 K/uL     Lymphs (Absolute) 1.41 1.00 - 4.80 K/uL    Monos (Absolute) 0.77 0.00 - 0.85 K/uL    Eos (Absolute) 0.03 0.00 - 0.51 K/uL    Baso (Absolute) 0.04 0.00 - 0.12 K/uL    Immature Granulocytes (abs) 0.06 0.00 - 0.11 K/uL    NRBC (Absolute) 0.00 K/uL     RADIOLOGY  I have independently interpreted the diagnostic imaging associated with this visit and am waiting the final reading from the radiologist.   My preliminary interpretation is as follows:   Chest x-ray: Normal lung fields, no consolidation or effusion  Left hand x-ray: comminuted fracture third metacarpal, displaced    Radiologist interpretation:   DX-CHEST-PORTABLE (1 VIEW)   Final Result      No acute cardiopulmonary disease evident.      DX-HAND 3+ LEFT   Final Result      1.  Open comminuted fracture of the distal LEFT 3rd metacarpal with displacement of the head fragment dorsally and disruption of the joint with adjacent soft tissue gas.   2.  Probable soft tissue gas at the radial aspect of the wrist.   3.  No dislocation.   4.  Osteoarthritis.          COURSE & MEDICAL DECISION MAKING    ED Observation Status? No; Patient does not meet criteria for ED Observation.   Patient will require hospitalization, operative intervention.    INITIAL ASSESSMENT, COURSE AND PLAN  Care Narrative:   5:22 PM multiple superficial skin tears right upper extremity, left lower extremity.  More complex deep tissue bite wound to the dorsal left hand with tendon and bony exposure.  Suspect bony fracture as well.  Add labs for preop, chest x-ray and EKG as well.  Add hand x-ray.  Add Unasyn.  Patient remains NPO.  Fentanyl for discomfort.    5:58 PM x-ray as above, open comminuted displaced left metacarpal fracture.  Patient has soft tissue injury, avulsion with deep structures visualized and possible tendon involvement.  Will discuss with orthopedics, plan hospitalist admission. Pain controlled with Fentanyl.    HTN/IDDM FOLLOW UP:  The patient has known hypertension and is being  followed by their primary care doctor    ADDITIONAL PROBLEM LIST  none    DISPOSITION AND DISCUSSIONS  I have discussed management of the patient with the following physicians and SAMANTA's:    5:58 PM Dr. Dumont, Orthopedics, scrubbed into the OR, nurse aware of patient's findings and will have him return call.    6:12 PM Dr. Huerta, hospitalist, is aware the patient agreeable to consultation.    1825 -Jonathan PA orthopedics is aware of patient and at bedside.    7:25 PM Dr. Dumont, orthopedics, is aware the patient agreeable to consultation.  Plan OR tomorrow.  Agrees with hospitalist admission above.    ED evaluation following dog bite with complex wound to the left hand, open fracture, soft tissue avulsion, cannot exclude tendon injury, deep structures are visualized but function seems to be intact.  Tetanus and IV antibiotics initiated in the emergency department.  She will require operative repair, hospitalist admission for further evaluation and treatment.    FINAL DIAGNOSIS  1. Dog bite, hand, left, initial encounter    2. Open displaced fracture of neck of third metacarpal bone of left hand, initial encounter    3. Soft tissue avulsion           Electronically signed by: Kayleigh Joshi D.O., 3/18/2023 5:17 PM

## 2023-03-19 NOTE — CONSULTS
3/18/2023    Time Called: 1800  Time Arrived: 1900      HPI: Letitia Ghosh is a 77 y.o. female who presents with left hand dog bite injury.  She was apparently bitten by her dog's earlier today.  She has a large dorsal wound down to tendons.  She denies any weakness or numbness or tingling in her hand.  Her pain is well controlled.    Past Medical History:   Diagnosis Date    Arthritis     Blood clotting disorder (HCC) 2006    right PE    Bowel habit changes     Cancer (HCC) 1968    cervical-partial hysterectomy    Cataract     bilat IOL    Fecal incontinence 2016    Solesta--anal canal bulking injection; Dr. Gatica    Heart burn     OTC nexium prn    Hiatus hernia syndrome     Hypothyroid     Indigestion     OTC nexium prn    Osteopenia     Osteoporosis     Pain     chronic back pain    Reflux     Right knee pain 08/05/2019    Urinary bladder disorder     interstitial cystitis.     Urinary incontinence        Past Surgical History:   Procedure Laterality Date    SD NEUROPLASTY & OR TRANSPOS ULNAR NERVE ELBOW Left 11/4/2022    Procedure: LEFT HAND OPEN CUBITAL TUNNEL RELEASE, POSSIBLE LEFT ULNAR NERVE TRANSPOSITION;  Surgeon: Yumi Clifton M.D.;  Location: [facility];  Service: Orthopedics    SD NEUROPLASTY & OR TRANSPOS MEDIAN NRV CARPAL DANIELE Left 11/4/2022    Procedure: LEFT OPEN CARPAL TUNNEL RELEASE;  Surgeon: Yumi Clifton M.D.;  Location: [facility];  Service: Orthopedics    PB REVISE KNEE JOINT REPLACE,ALL PARTS Right 8/19/2019    Procedure: REVISION, TOTAL ARTHROPLASTY, KNEE, ALL COMPONENTS;  Surgeon: Philip Garcia M.D.;  Location: SURGERY [facility];  Service: Orthopedics    FUSION, SPINE, LUMBAR, PLIF  10/3/2016    Procedure: LUMBAR FUSION POSTERIOR L2-3 laminectomy;  Surgeon: Philip Ham M.D.;  Location: SURGERY [facility];  Service:     HARDWARE REMOVAL NEURO  10/3/2016    Procedure: HARDWARE REMOVAL NEURO L3-S1;   Surgeon: Philip Ham M.D.;  Location: SURGERY [facility];  Service:     LUMBAR EXPLORATION  10/3/2016    Procedure: LUMBAR EXPLORATION POSTERIOR L2-S1 EXPLORATION OF FUSION;  Surgeon: Philip Ham M.D.;  Location: SURGERY [facility];  Service:     ENDOSCOPY PROCEDURE  8/15/2016    Procedure: ENDOSCOPY PROCEDURE;  Surgeon: Tommy Das M.D.;  Location: SURGERY [facility];  Service:     BOWEL STIMULATOR INSERTION  7/27/2016    Procedure: BOWEL STIMULATOR INSERTION FOR PERMANENT INTERSTIM SACRAL IMPLANT;  Surgeon: Margarito Gatica M.D.;  Location: SURGERY [facility];  Service:     ARTHROPLASTY Right 9/15/15    R TKR Dr Izaguirre    OTHER ORTHOPEDIC SURGERY  2015    right knee    COLONOSCOPY  2010    CATARACT PHACO WITH IOL Bilateral 2009    SALPINGECTOMY Bilateral 1978    with oophorectomy    BLADDER SUSPENSION  1975    LUMPECTOMY Bilateral 1975    breasts for benign tumors    ABDOMINAL SUBTOTAL HYSTERECTOMY  1968    LAMINOTOMY  2006 & 2009    L3-S1 '09  (L4-5 was done in '06)       Medications  No current facility-administered medications on file prior to encounter.     Current Outpatient Medications on File Prior to Encounter   Medication Sig Dispense Refill    levothyroxine (SYNTHROID) 125 MCG Tab Take 125 mcg by mouth every morning on an empty stomach.      meloxicam (MOBIC) 15 MG tablet Take 1 Tablet by mouth every day. 90 Tablet 3    DULoxetine (CYMBALTA) 30 MG Cap DR Particles TAKE ONE CAPSULE BY MOUTH DAILY (Patient taking differently: Take 30 mg by mouth every evening.) 30 Capsule 0    liothyronine (CYTOMEL) 25 MCG Tab Take 25 mcg by mouth every day. Take with liothyronine 5 mcg ( x 3 tabs) = total dose 40 mcg      liothyronine (CYTOMEL) 5 MCG Tab Take 15 mcg by mouth every day. Take with liothyronine 25 mcg = 40 mcg    Liothyronine 5 mcg ( x 3 tabs) = 15 mcg         Allergies  Celebrex [celecoxib], Codeine, Contrast media with iodine [iodine], Gadolinium derivatives, Latex, Percocet  [oxycodone-acetaminophen], Sulfa drugs, Tape, Z-pack [azithromycin], and Hydrocodone    ROS  . All other systems were reviewed and found to be negative    Family History   Problem Relation Age of Onset    Heart Disease Mother     Diabetes Father        Social History     Socioeconomic History    Marital status:    Tobacco Use    Smoking status: Former     Packs/day: 1.00     Years: 25.00     Pack years: 25.00     Types: Cigarettes     Quit date: 1987     Years since quittin.5    Smokeless tobacco: Never   Vaping Use    Vaping Use: Never used   Substance and Sexual Activity    Alcohol use: No    Drug use: No    Sexual activity: Not Currently     Comment: ; Brendan Datt is her POA       Physical Exam  Vitals  BP (!) 142/64   Pulse 91   Temp 37.1 °C (98.7 °F) (Temporal)   Resp 18   Ht 1.524 m (5')   Wt 57.2 kg (126 lb)   SpO2 94%   General: Well Developed, Well Nourished, Age appropriate appearance  HEENT: Normocephalic, atraumatic  Psych: Normal mood and affect  Neck: Supple, nontender, no masses  Lungs: Breathing unlabored, No audible wheezing  Heart: Regular heart rate and rhythm  Abdomen: Soft, NT, ND  Neuro: Sensation grossly intact to BUE and BLE, moving all four extremities  Skin: Intact, no open wounds  Vascular: , Capillary refill <2 seconds  MSK: Left hand: Large full-thickness dorsal wound with exposed tendons.  No obvious tendinous injury.  Sensation intact to light touch all digits.  AIN/PIN intact.      Radiographs:  DX-CHEST-PORTABLE (1 VIEW)   Final Result      No acute cardiopulmonary disease evident.      DX-HAND 3+ LEFT   Final Result      1.  Open comminuted fracture of the distal LEFT 3rd metacarpal with displacement of the head fragment dorsally and disruption of the joint with adjacent soft tissue gas.   2.  Probable soft tissue gas at the radial aspect of the wrist.   3.  No dislocation.   4.  Osteoarthritis.          Laboratory Values  Recent Labs     23  1720    WBC 10.3   RBC 4.65   HEMOGLOBIN 13.5   HEMATOCRIT 41.3   MCV 88.8   MCH 29.0   MCHC 32.7*   RDW 41.2   PLATELETCT 387   MPV 9.5     Recent Labs     03/18/23  1720   SODIUM 140   POTASSIUM 2.8*   CHLORIDE 101   CO2 23   GLUCOSE 121*   BUN 18     Recent Labs     03/18/23  1720   APTT 29.4   INR 1.03         Impression: 77-year-old female dog bite to the left hand.  Large dorsal wound.  We discussed this may require multiple surgeries for irrigation debridement and closure as she has very poor skin with a large full-thickness wound.  We will plan on taking her to the OR for I&D possible closure.  N.p.o. at midnight.    Plan:We discussed the diagnosis and findings with the patient at length.  We reviewed possible non operative and operative interventions and the risks and benefits of each of these.  she had a chance to ask questions and all of these were answered to her satisfaction. The patient chose to proceed with surgical intervention. Risks and benefits of surgery were discussed which include but are not limited to bleeding, infection, neurovascular damage, malunion, nonunion, instability, limb length discrepancy, DVT, PE, MI, Stroke and death. They understand these risks and wish to proceed.      Tyler Dumont MD  Orthopedic Trauma Surgery

## 2023-03-19 NOTE — ED NOTES
Med rec updated and complete. Allergies reviewed. Confirmed name and date of birth.  Pt denies antibiotic use in last 30 days.      [place] [facility] [phone]

## 2023-03-19 NOTE — OR NURSING
Patient recovered well post op. A&Ox4. VSS, 2L nc. Surgical sites CDI. Surgical pain managed. Patient able to drink fluids without Nausea and vomiting. PT belongings in her room. Spouse updated and discussed plan of care. Report called to Fabio CARDENAS. Patient transported by this nurse back to her room.

## 2023-03-19 NOTE — WOUND TEAM
Wound consult placed on 3/18/23 regarding Left hand dog bite. Pt was evaluated by surgery and is going to OR for debridement and possible closure of the hand. Pt has scattered abrasions which can be left open to air. No advanced wound care needs identified at this time. Wound consult completed.

## 2023-03-19 NOTE — ANESTHESIA TIME REPORT
Anesthesia Start and Stop Event Times     Date Time Event    3/19/2023 0938 Ready for Procedure     1122 Anesthesia Start     1249 Anesthesia Stop        Responsible Staff  03/19/23    Name Role Begin End    Isauro Huynh M.D. Anesth 1122 1249        Overtime Reason:  no overtime (within assigned shift)    Comments:

## 2023-03-19 NOTE — H&P
Hospital Medicine History & Physical Note    Date of Service  3/18/2023    Primary Care Physician  Les Mendosa D.O.    Consultants  orthopedics      Code Status  Full Code    Chief Complaint  Chief Complaint   Patient presents with    Dog Bite       History of Presenting Illness  Letitia Ghosh is a 77 y.o. female who presented 3/18/2023 with dog bite. Patient with history of hypothyroidism, presents with dog bit. Patient's domesticated two dogs were fighting earlier today, patient tried to break them up. Patient's dog bit her left hand causing injury and bleeding. Patient with right arm and leg skin tearing but no reported puncture points other than left hand. She denies pain at bedside.  In the ED, /70. K 2.8, CBC and CMP unremarkable. Hand x ray shows open comminuted fracture of distal left 3rd metacarpal with displacement and disruption of joint with adjacent soft tissue gas. Orthopedics surgery consulted. Patient NPO for now. Started on antibiotics.    I discussed the plan of care with patient and family.    Review of Systems  Review of Systems   Constitutional:  Negative for chills and fever.   Eyes:  Negative for blurred vision and pain.   Respiratory:  Negative for cough and shortness of breath.    Cardiovascular:  Negative for chest pain, palpitations and leg swelling.   Gastrointestinal:  Negative for abdominal pain, nausea and vomiting.   Genitourinary:  Negative for dysuria and urgency.   Musculoskeletal:  Positive for myalgias. Negative for back pain and falls.   Skin:  Negative for itching and rash.        +skin tears   Neurological:  Negative for dizziness, sensory change, focal weakness, loss of consciousness and headaches.   Endo/Heme/Allergies:  Bruises/bleeds easily.   Psychiatric/Behavioral:  Negative for substance abuse. The patient does not have insomnia.      Past Medical History   has a past medical history of Arthritis, Blood clotting disorder (HCC) (2006), Bowel habit  changes, Cancer (HCC) (1968), Cataract, Fecal incontinence (2016), Heart burn, Hiatus hernia syndrome, Hypothyroid, Indigestion, Osteopenia, Osteoporosis, Pain, Reflux, Right knee pain (08/05/2019), Urinary bladder disorder, and Urinary incontinence.    Surgical History   has a past surgical history that includes bladder suspension (1975); laminotomy (2006 & 2009); arthroplasty (Right, 9/15/15); bowel stimulator insertion (7/27/2016); endoscopy procedure (8/15/2016); other orthopedic surgery (2015); fusion, spine, lumbar, plif (10/3/2016); hardware removal neuro (10/3/2016); lumbar exploration (10/3/2016); abdominal subtotal hysterectomy (1968); salpingectomy (Bilateral, 1978); lumpectomy (Bilateral, 1975); colonoscopy (2010); cataract phaco with iol (Bilateral, 2009); pr revise knee joint replace,all parts (Right, 8/19/2019); pr neuroplasty & or transpos ulnar nerve elbow (Left, 11/4/2022); and pr neuroplasty & or transpos median nrv carpal zhou (Left, 11/4/2022).     Family History  family history includes Diabetes in her father; Heart Disease in her mother.   Family history reviewed with patient. There is no family history that is pertinent to the chief complaint.     Social History   reports that she quit smoking about 35 years ago. Her smoking use included cigarettes. She has a 25.00 pack-year smoking history. She has never used smokeless tobacco. She reports that she does not drink alcohol and does not use drugs.    Allergies  Allergies   Allergen Reactions    Celebrex [Celecoxib] Itching     After one dose      Codeine      Hallucinate      Contrast Media With Iodine [Iodine] Rash     Rash  Pt states she had reaction even after pre-medication protocol    Gadolinium Derivatives Rash     Pt states she turned brilliant red and hot. This was used prior to hip injection.     States she has reaction even after premedication protocol.    Latex Rash     Rash and blisters    Percocet [Oxycodone-Acetaminophen]       Hallucinate    Sulfa Drugs Itching    Tape Rash     Rash and blisters.  Paper tape is OK    Z-Pack [Azithromycin] Vomiting    Hydrocodone      Hallucinations        Medications  Prior to Admission Medications   Prescriptions Last Dose Informant Patient Reported? Taking?   DULoxetine (CYMBALTA) 30 MG Cap DR Particles 3/17/2023 at 1930 Patient No No   Sig: TAKE ONE CAPSULE BY MOUTH DAILY   Patient taking differently: Take 30 mg by mouth every evening.   levothyroxine (SYNTHROID) 125 MCG Tab 3/18/2023 at 0700 Patient Yes Yes   Sig: Take 125 mcg by mouth every morning on an empty stomach.   liothyronine (CYTOMEL) 25 MCG Tab 3/18/2023 at 0730 Patient Yes No   Sig: Take 25 mcg by mouth every day. Take with liothyronine 5 mcg ( x 3 tabs) = total dose 45 mcg   liothyronine (CYTOMEL) 5 MCG Tab 3/18/2023 at 0730 Patient Yes No   Sig: Take 15 mcg by mouth every day. Take with liothyronine 25 mcg = 45 mcg    Liothyronine 5 mcg ( x 3 tabs) = 15 mcg   meloxicam (MOBIC) 15 MG tablet 3/18/2023 at 0730 Patient No No   Sig: Take 1 Tablet by mouth every day.      Facility-Administered Medications: None       Physical Exam  Temp:  [37.1 °C (98.7 °F)] 37.1 °C (98.7 °F)  Pulse:  [90] 90  Resp:  [18] 18  BP: (146)/(70) 146/70  SpO2:  [92 %] 92 %  Blood Pressure : (!) 146/70   Temperature: 37.1 °C (98.7 °F)   Pulse: 90   Respiration: 18   Pulse Oximetry: 92 %       Physical Exam  Vitals reviewed.   Constitutional:       General: She is not in acute distress.     Appearance: She is not diaphoretic.   HENT:      Head: Normocephalic and atraumatic.      Right Ear: External ear normal.      Left Ear: External ear normal.      Nose: Nose normal. No congestion.      Mouth/Throat:      Pharynx: No oropharyngeal exudate or posterior oropharyngeal erythema.   Eyes:      Extraocular Movements: Extraocular movements intact.      Pupils: Pupils are equal, round, and reactive to light.   Cardiovascular:      Rate and Rhythm: Normal rate and regular  rhythm.   Pulmonary:      Effort: Pulmonary effort is normal. No respiratory distress.      Breath sounds: Normal breath sounds. No wheezing or rales.   Abdominal:      General: Bowel sounds are normal. There is no distension.      Palpations: Abdomen is soft.      Tenderness: There is no abdominal tenderness.   Musculoskeletal:         General: Signs of injury present. No swelling. Normal range of motion.      Cervical back: Normal range of motion and neck supple.      Right lower leg: No edema.      Left lower leg: No edema.      Comments: Left dorsal hand with exposed tendon and tissues, open skin flap; multiple skin abrasions right forearm   Skin:     General: Skin is warm and dry.   Neurological:      General: No focal deficit present.      Mental Status: She is alert and oriented to person, place, and time.      Cranial Nerves: No cranial nerve deficit.      Sensory: No sensory deficit.      Motor: No weakness.   Psychiatric:         Mood and Affect: Mood normal.         Behavior: Behavior normal.       Laboratory:  Recent Labs     03/18/23  1720   WBC 10.3   RBC 4.65   HEMOGLOBIN 13.5   HEMATOCRIT 41.3   MCV 88.8   MCH 29.0   MCHC 32.7*   RDW 41.2   PLATELETCT 387   MPV 9.5     Recent Labs     03/18/23  1720   SODIUM 140   POTASSIUM 2.8*   CHLORIDE 101   CO2 23   GLUCOSE 121*   BUN 18   CREATININE 0.58   CALCIUM 8.9     Recent Labs     03/18/23  1720   ALTSGPT 18   ASTSGOT 23   ALKPHOSPHAT 78   TBILIRUBIN 0.3   GLUCOSE 121*     Recent Labs     03/18/23  1720   APTT 29.4   INR 1.03     No results for input(s): NTPROBNP in the last 72 hours.      No results for input(s): TROPONINT in the last 72 hours.    Imaging:  DX-CHEST-PORTABLE (1 VIEW)   Final Result      No acute cardiopulmonary disease evident.      DX-HAND 3+ LEFT   Final Result      1.  Open comminuted fracture of the distal LEFT 3rd metacarpal with displacement of the head fragment dorsally and disruption of the joint with adjacent soft tissue gas.    2.  Probable soft tissue gas at the radial aspect of the wrist.   3.  No dislocation.   4.  Osteoarthritis.            Assessment/Plan:  Justification for Admission Status  I anticipate this patient will require at least two midnights for appropriate medical management, necessitating inpatient admission because treatment and recovery from dog bite including surgery is expected to take >2 midnights.    Patient will need a Med/Surg bed on MEDICAL service .  The need is secondary to continued treatment of dog bite.    * Dog bite of hand, initial encounter- (present on admission)  Assessment & Plan  Presents for dog bite earlier today; with exposed left hand tendon and skin flap; 3rd metacarpal fracture on xray  Continue unasyn antibiotics  NPO, plan for washout surgery by ortho timing TBD, possibly today vs tomorrow  Start IV fluids for hydration  Follow up surgical cultures    Hypothyroidism  Assessment & Plan  Continue home thyroid supplements        VTE prophylaxis: SCDs/TEDs

## 2023-03-19 NOTE — ED TRIAGE NOTES
Pt bib ems c/o dog bite to left and and forearms. Bleeding controlled. Pt has dressing in place to left hand. Pt states unknown last tetanus. vss

## 2023-03-19 NOTE — ANESTHESIA PROCEDURE NOTES
Airway    Date/Time: 3/19/2023 11:26 AM  Performed by: Isuaro Huynh M.D.  Authorized by: Isauro Huynh M.D.     Location:  OR  Urgency:  Elective  Indications for Airway Management:  Anesthesia      Spontaneous Ventilation: absent    Sedation Level:  Deep  Preoxygenated: Yes    Final Airway Type:  Supraglottic airway  Final Supraglottic Airway:  Standard LMA    SGA Size:  3  Number of Attempts at Approach:  1   Poor seal on 1st placement, good seal on 2nd placement

## 2023-03-19 NOTE — PROGRESS NOTES
Hospital Medicine Daily Progress Note    Date of Service  3/19/2023    Chief Complaint  Letitia Ghosh is a 77 y.o. female admitted 3/18/2023 with   Chief Complaint   Patient presents with    Dog Bite         Hospital Course  This is 77-year-old female with a past medical history significant for hypothyroidism presented to the ER on 3/18/2023 after she had a dog bite.  Patient report that she had had 2 dogs, and they were fighting; when she tried to break their fight; she got injured in right arm.    Imaging showed open comminuted fracture of the distal left third metacarpal with displacement and disruption of the joint with adjacent soft tissue.  Orthopedic surgery was consulted, patient underwent left hand metacarpal percutaneous pinning; left hand complex wound closure Dr. Tyler Dumont on 3/19/2023.  Per orthopedic surgery, nonweightbearing in the left hand.       Interval events:  -- No acute events overnight, vital signs been stable, patient is alert, awake, answering questions appropriately.  Potassium noted to be 2.9 with repeat oral potassium, repeat BMP at a.m.  Magnesium at 1.8, will provide 2 g of IV magnesium  -- MRSA PCR negative.  -- Patient underwent surgery today, will follow surgery recommendation, nonweightbearing left hand  Multimodal pain management, PT/OT    I have discussed this patient's plan of care and discharge plan at IDT rounds today with Case Management, Nursing, Nursing leadership, and other members of the IDT team.    Consultants/Specialty  orthopedics    Code Status  DNAR/DNI    Disposition  Patient is not medically cleared for discharge.   Anticipate discharge to to home with close outpatient follow-up.  I have placed the appropriate orders for post-discharge needs.    Review of Systems  Review of Systems   Constitutional:  Positive for malaise/fatigue. Negative for fever.   HENT:  Negative for congestion and sore throat.    Eyes:  Negative for blurred vision and double  vision.   Respiratory:  Negative for cough and hemoptysis.    Cardiovascular:  Negative for chest pain and palpitations.   Gastrointestinal:  Negative for abdominal pain, heartburn, nausea and vomiting.   Musculoskeletal:  Positive for joint pain.   Neurological:  Negative for tingling and focal weakness.   Psychiatric/Behavioral:  The patient is nervous/anxious.    All other systems reviewed and are negative.     Physical Exam  Temp:  [36.2 °C (97.2 °F)-37.3 °C (99.1 °F)] 36.4 °C (97.5 °F)  Pulse:  [] 104  Resp:  [15-20] 15  BP: (114-146)/(57-70) 121/57  SpO2:  [92 %-98 %] 94 %    Physical Exam  Vitals and nursing note reviewed.   Constitutional:       Appearance: Normal appearance.   HENT:      Head: Normocephalic and atraumatic.   Eyes:      Extraocular Movements: Extraocular movements intact.      Pupils: Pupils are equal, round, and reactive to light.   Cardiovascular:      Rate and Rhythm: Normal rate.   Abdominal:      Palpations: Abdomen is soft.   Musculoskeletal:      Cervical back: Neck supple.      Comments: Wound in her left hand, wrapped in dressing  Multiple bite wounds on her left leg   Neurological:      Mental Status: She is alert and oriented to person, place, and time.      Cranial Nerves: No cranial nerve deficit.       Fluids    Intake/Output Summary (Last 24 hours) at 3/19/2023 1532  Last data filed at 3/19/2023 1232  Gross per 24 hour   Intake 600 ml   Output 15 ml   Net 585 ml       Laboratory  Recent Labs     03/18/23  1720 03/19/23  0422   WBC 10.3 8.5   RBC 4.65 4.04*   HEMOGLOBIN 13.5 11.9*   HEMATOCRIT 41.3 35.2*   MCV 88.8 87.1   MCH 29.0 29.5   MCHC 32.7* 33.8   RDW 41.2 41.1   PLATELETCT 387 316   MPV 9.5 9.9     Recent Labs     03/18/23  1720 03/19/23  0422   SODIUM 140 139   POTASSIUM 2.8* 2.9*   CHLORIDE 101 102   CO2 23 26   GLUCOSE 121* 109*   BUN 18 12   CREATININE 0.58 0.45*   CALCIUM 8.9 7.9*     Recent Labs     03/18/23  1720   APTT 29.4   INR 1.03                Imaging  DX-HAND 3+ LEFT   Final Result      Fourth metacarpal K wires with improved alignment of displaced head fragments      DX-CHEST-PORTABLE (1 VIEW)   Final Result      No acute cardiopulmonary disease evident.      DX-HAND 3+ LEFT   Final Result      1.  Open comminuted fracture of the distal LEFT 3rd metacarpal with displacement of the head fragment dorsally and disruption of the joint with adjacent soft tissue gas.   2.  Probable soft tissue gas at the radial aspect of the wrist.   3.  No dislocation.   4.  Osteoarthritis.      DX-PORTABLE FLUORO > 1 HOUR    (Results Pending)        Assessment/Plan  * Dog bite of hand, initial encounter- (present on admission)  Assessment & Plan  Presents for dog bite earlier today; with exposed left hand tendon and skin flap; 3rd metacarpal fracture on xray   -- Blood CX X 2  ordered, pending MRSA nasal negative   --Orthopedic surgery was consulted, patient underwent left hand metacarpal percutaneous pinning; left hand complex wound closure Dr. Tyler Dumont on 3/19/2023.  Per orthopedic surgery, nonweightbearing in the left hand.   -- Continue unasyn     Advance care planning  Assessment & Plan  Patient was of sound mind and voluntarily participated in the conversation.   Patient was present for the conversation.  I discussed advance care planning face to face with the patient for at least 17 minutes, including diagnosis, prognosis, plan of care, risks and benefits of any therapies that could be offered, as well as alternatives including palliation.    Patient stated DNAR/DNI    Hypokalemia  Assessment & Plan  At 2.9; provide 40 mg of potassium x2, magnesium 1.9, provide 2 g of IV magnesium, repeat BMP at a.m.    Hypothyroidism  Assessment & Plan  Continue home thyroid supplements         VTE prophylaxis: SCDs/TEDs

## 2023-03-19 NOTE — CARE PLAN
The patient is Stable - Low risk of patient condition declining or worsening    Shift Goals  Clinical Goals: pain control, IV ABX, NPO @ midnight  Patient Goals: rest  Family Goals: not present    Progress made toward(s) clinical / shift goals:    Problem: Knowledge Deficit - Standard  Goal: Patient and family/care givers will demonstrate understanding of plan of care, disease process/condition, diagnostic tests and medications  Outcome: Progressing     Problem: Pain - Standard  Goal: Alleviation of pain or a reduction in pain to the patient’s comfort goal  Outcome: Progressing       Patient is not progressing towards the following goals:

## 2023-03-19 NOTE — PROGRESS NOTES
4 Eyes Skin Assessment Completed by RONALD Haji and RONALD Salomon.    Head WDL  Ears WDL  Nose WDL  Mouth WDL  Neck WDL  Breast/Chest WDL  Shoulder Blades WDL  Spine WDL  (R) Arm/Elbow/Hand KOSTAS (DIP), bruises to elbow  (L) Arm/Elbow/Hand KOSTAS (DIP), abrasions to elbow  Abdomen WDL  Groin WDL  Scrotum/Coccyx/Buttocks WDL  (R) Leg scar to knee, abrasions  (L) Leg Abrasions  (R) Heel/Foot/Toe WDL  (L) Heel/Foot/Toe WDL          Devices In Places Blood Pressure Cuff, Pulse Ox, and SCD's      Interventions In Place Pillows and Pressure Redistribution Mattress    Possible Skin Injury Yes    Pictures Uploaded Into Epic Yes  Wound Consult Placed Yes  RN Wound Prevention Protocol Ordered Yes

## 2023-03-19 NOTE — PROGRESS NOTES
D/W Dr. Joshi  77F s/p CTR Dr. Bailon    full-thickness dog bite L hand      Recommend NPO pending formal consultation

## 2023-03-19 NOTE — OP REPORT
DATE OF OPERATION: 3/19/2023     PREOPERATIVE DIAGNOSIS: Left hand complex wound after dog bite  Left fourth metacarpal neck open fracture    POSTOPERATIVE DIAGNOSIS: Same    PROCEDURE PERFORMED: Left hand metacarpal percutaneous pinning  Left hand complex wound closure    SURGEON: Tyler Dumont M.D.     ASSISTANT: None    ANESTHESIA: General    SPECIMEN: None    ESTIMATED BLOOD LOSS: 10 mL    IMPLANTS: K wires x2      INDICATIONS: The patient is a 77 y.o. female who presented with above.  I discussed the risks and benefits of the procedure which include but are not limited to risks of infection, wound healing complication, neurovascular injury, pain, malunion, non-union, malrotation, and the medical risks of anesthesia including MI, stroke, and death.  Alternatives to surgery were also discussed, including non-operative management, which I did not recommend.  The patient was in agreement with the plan to proceed, and the informed consent was signed and documented.  I met with the patient pre-operatively and marked the operative extremity with their agreement.  We proceeded to the operating room.     DESCRIPTION OF PROCEDURE:  Patient was seen in the preoperative holding area on the day of surgery. The operative site was marked with my initials.  she was taken to the operating room and placed supine on the operative table.  Anesthesia was induced.  The operative extremity was prepped and draped in the normal sterile fashion.  Operative pause was conducted and the correct patient, site, side, procedure, and surgeon's initials on extremity were identified.  Patient sustained a full-thickness dorsal degloving wound after dog bite.  This was examined and noted to have significant soft tissue damage.  Degloving area measuring approximately 20 cm² down to bone.  There is a displaced fourth metacarpal neck fracture with the head sticking out of the wound.  This was reduced and 2 K wires were drilled in retrograde  fashion.  We then irrigated the wound with sterile saline.  Skin was then closed with 3-0 nylon.  Of note the skin was of very poor quality.  Sterile dressings were then applied.  Patient was placed in a splint.  She was woken taken to PACU in stable condition.    POSTOPERATIVE PLAN: Nonweightbearing left hand.  Leave dressings in place until follow-up.  Patient requested follow-up with Dr. Taylor Bailon in 1 week for wound evaluation.  We did discuss that she may require additional surgeries due to poor quality soft tissue left.  Okay for discharge home when pain is controlled.  Recommend home on oral antibiotics.  Follow-up with above.      ____________________________________   Tyler Dumont M.D.   DD: 3/19/2023  1:03 PM

## 2023-03-19 NOTE — HOSPITAL COURSE
This is 77-year-old female with a past medical history significant for hypothyroidism presented to the ER on 3/18/2023 after she had a dog bite.  Patient report that she had had 2 dogs, and they were fighting; when she tried to break their fight; she got injured in right arm.    Imaging showed open comminuted fracture of the distal left third metacarpal with displacement and disruption of the joint with adjacent soft tissue.  Orthopedic surgery was consulted, patient underwent left hand metacarpal percutaneous pinning; left hand complex wound closure Dr. Tyler Dumont on 3/19/2023.  Per orthopedic surgery, nonweightbearing in the left hand.       Interval events:  -- No acute events overnight, vital signs been stable, patient is alert, awake, answering questions appropriately.  Potassium noted to be 2.9 with repeat oral potassium, repeat BMP at a.m.  Magnesium at 1.8, will provide 2 g of IV magnesium  -- MRSA PCR negative.  -- Patient underwent surgery today, will follow surgery recommendation, nonweightbearing left hand  Multimodal pain management, PT/OT--

## 2023-03-20 PROBLEM — E83.39 HYPOPHOSPHATEMIA: Status: ACTIVE | Noted: 2023-03-20

## 2023-03-20 LAB
ALBUMIN SERPL BCP-MCNC: 3.4 G/DL (ref 3.2–4.9)
BUN SERPL-MCNC: 15 MG/DL (ref 8–22)
CALCIUM ALBUM COR SERPL-MCNC: 8.2 MG/DL (ref 8.5–10.5)
CALCIUM SERPL-MCNC: 7.7 MG/DL (ref 8.5–10.5)
CHLORIDE SERPL-SCNC: 103 MMOL/L (ref 96–112)
CO2 SERPL-SCNC: 25 MMOL/L (ref 20–33)
CREAT SERPL-MCNC: 0.49 MG/DL (ref 0.5–1.4)
ERYTHROCYTE [DISTWIDTH] IN BLOOD BY AUTOMATED COUNT: 43 FL (ref 35.9–50)
GFR SERPLBLD CREATININE-BSD FMLA CKD-EPI: 96 ML/MIN/1.73 M 2
GLUCOSE SERPL-MCNC: 195 MG/DL (ref 65–99)
HCT VFR BLD AUTO: 36 % (ref 37–47)
HGB BLD-MCNC: 11.8 G/DL (ref 12–16)
MCH RBC QN AUTO: 29.1 PG (ref 27–33)
MCHC RBC AUTO-ENTMCNC: 32.8 G/DL (ref 33.6–35)
MCV RBC AUTO: 88.9 FL (ref 81.4–97.8)
PHOSPHATE SERPL-MCNC: 1.6 MG/DL (ref 2.5–4.5)
PLATELET # BLD AUTO: 307 K/UL (ref 164–446)
PMV BLD AUTO: 9.9 FL (ref 9–12.9)
POTASSIUM SERPL-SCNC: 4.5 MMOL/L (ref 3.6–5.5)
RBC # BLD AUTO: 4.05 M/UL (ref 4.2–5.4)
SODIUM SERPL-SCNC: 137 MMOL/L (ref 135–145)
WBC # BLD AUTO: 7.8 K/UL (ref 4.8–10.8)

## 2023-03-20 PROCEDURE — 700111 HCHG RX REV CODE 636 W/ 250 OVERRIDE (IP): Performed by: STUDENT IN AN ORGANIZED HEALTH CARE EDUCATION/TRAINING PROGRAM

## 2023-03-20 PROCEDURE — 700102 HCHG RX REV CODE 250 W/ 637 OVERRIDE(OP): Performed by: HOSPITALIST

## 2023-03-20 PROCEDURE — 99233 SBSQ HOSP IP/OBS HIGH 50: CPT | Performed by: HOSPITALIST

## 2023-03-20 PROCEDURE — 700102 HCHG RX REV CODE 250 W/ 637 OVERRIDE(OP): Performed by: STUDENT IN AN ORGANIZED HEALTH CARE EDUCATION/TRAINING PROGRAM

## 2023-03-20 PROCEDURE — 85027 COMPLETE CBC AUTOMATED: CPT

## 2023-03-20 PROCEDURE — 700101 HCHG RX REV CODE 250: Performed by: HOSPITALIST

## 2023-03-20 PROCEDURE — 700105 HCHG RX REV CODE 258: Performed by: STUDENT IN AN ORGANIZED HEALTH CARE EDUCATION/TRAINING PROGRAM

## 2023-03-20 PROCEDURE — 80069 RENAL FUNCTION PANEL: CPT

## 2023-03-20 PROCEDURE — 700105 HCHG RX REV CODE 258: Performed by: HOSPITALIST

## 2023-03-20 PROCEDURE — A9270 NON-COVERED ITEM OR SERVICE: HCPCS | Performed by: HOSPITALIST

## 2023-03-20 PROCEDURE — 770001 HCHG ROOM/CARE - MED/SURG/GYN PRIV*

## 2023-03-20 PROCEDURE — 99024 POSTOP FOLLOW-UP VISIT: CPT | Performed by: STUDENT IN AN ORGANIZED HEALTH CARE EDUCATION/TRAINING PROGRAM

## 2023-03-20 PROCEDURE — A9270 NON-COVERED ITEM OR SERVICE: HCPCS | Performed by: STUDENT IN AN ORGANIZED HEALTH CARE EDUCATION/TRAINING PROGRAM

## 2023-03-20 RX ADMIN — AMPICILLIN AND SULBACTAM 3 G: 1; 2 INJECTION, POWDER, FOR SOLUTION INTRAMUSCULAR; INTRAVENOUS at 08:10

## 2023-03-20 RX ADMIN — AMOXICILLIN AND CLAVULANATE POTASSIUM 1 TABLET: 875; 125 TABLET, FILM COATED ORAL at 12:29

## 2023-03-20 RX ADMIN — ACETAMINOPHEN 650 MG: 325 TABLET, FILM COATED ORAL at 18:42

## 2023-03-20 RX ADMIN — AMPICILLIN AND SULBACTAM 3 G: 1; 2 INJECTION, POWDER, FOR SOLUTION INTRAMUSCULAR; INTRAVENOUS at 00:48

## 2023-03-20 RX ADMIN — ACETAMINOPHEN 650 MG: 325 TABLET, FILM COATED ORAL at 00:47

## 2023-03-20 RX ADMIN — DULOXETINE HYDROCHLORIDE 30 MG: 30 CAPSULE, DELAYED RELEASE ORAL at 18:38

## 2023-03-20 RX ADMIN — LIOTHYRONINE SODIUM 37.5 MCG: 25 TABLET ORAL at 04:56

## 2023-03-20 RX ADMIN — LEVOTHYROXINE SODIUM 125 MCG: 0.12 TABLET ORAL at 04:56

## 2023-03-20 RX ADMIN — SODIUM PHOSPHATE, MONOBASIC, MONOHYDRATE AND SODIUM PHOSPHATE, DIBASIC, ANHYDROUS 30 MMOL: 276; 142 INJECTION, SOLUTION INTRAVENOUS at 12:26

## 2023-03-20 ASSESSMENT — ENCOUNTER SYMPTOMS
NERVOUS/ANXIOUS: 1
HEARTBURN: 0
ABDOMINAL PAIN: 0
COUGH: 0
BLURRED VISION: 0
FOCAL WEAKNESS: 0
FEVER: 0
VOMITING: 0
HEMOPTYSIS: 0
TINGLING: 0
PALPITATIONS: 0
SORE THROAT: 0

## 2023-03-20 ASSESSMENT — PAIN DESCRIPTION - PAIN TYPE
TYPE: SURGICAL PAIN
TYPE: SURGICAL PAIN

## 2023-03-20 NOTE — CARE PLAN
The patient is Stable - Low risk of patient condition declining or worsening    Shift Goals  Clinical Goals: pain control; wound care  Patient Goals: comfort; rest  Family Goals: not present    Progress made toward(s) clinical / shift goals:    Problem: Knowledge Deficit - Standard  Goal: Patient and family/care givers will demonstrate understanding of plan of care, disease process/condition, diagnostic tests and medications  Outcome: Progressing     Problem: Pain - Standard  Goal: Alleviation of pain or a reduction in pain to the patient’s comfort goal  Outcome: Met     Problem: Respiratory  Goal: Patient will achieve/maintain optimum respiratory ventilation and gas exchange  Outcome: Progressing     Problem: Infection - Standard  Goal: Patient will remain free from infection  Outcome: Progressing     Problem: Wound/ / Incision Healing  Goal: Patient's wound/surgical incision will decrease in size and heals properly  Outcome: Progressing     Problem: Skin Integrity  Goal: Skin integrity is maintained or improved  Outcome: Progressing       Patient is not progressing towards the following goals:

## 2023-03-20 NOTE — DISCHARGE PLANNING
Case Management Discharge Planning    Admission Date: 3/18/2023  GMLOS: 2.2  ALOS: 1    6-Clicks ADL Score:    6-Clicks Mobility Score:        Anticipated Discharge Dispo: Discharge Disposition: Discharged to home/self care (01)    DME Needed: No    Action(s) Taken: MDR discussion, chart review, IV abt. Cleared by ortho. Need OT recs.     Escalations Completed: None    Medically Clear: No    Next Steps: OT eval.     Barriers to Discharge: Medical clearance    Is the patient up for discharge tomorrow: No

## 2023-03-20 NOTE — CARE PLAN
The patient is Stable - Low risk of patient condition declining or worsening    Shift Goals  Clinical Goals: MRSA swab, monitor potassium, surgery  Patient Goals: surgery  Family Goals: not present    Progress made toward(s) clinical / shift goals:  yes      Problem: Knowledge Deficit - Standard  Goal: Patient and family/care givers will demonstrate understanding of plan of care, disease process/condition, diagnostic tests and medications  Outcome: Progressing     Problem: Pain - Standard  Goal: Alleviation of pain or a reduction in pain to the patient’s comfort goal  Outcome: Progressing     Problem: Respiratory  Goal: Patient will achieve/maintain optimum respiratory ventilation and gas exchange  Outcome: Progressing     Problem: Infection - Standard  Goal: Patient will remain free from infection  Outcome: Progressing  Flowsheets (Taken 3/19/2023 1818)  Standard Infection Interventions:   Assessed for signs and symptoms of infection   Implemented standard precautions   Instructed patient/family on signs and symptoms of infection   Provided education on proper hand hygiene and infection prevention measures   Assessed for removal IV, central lines, intra-arterial or urinary catheters     Problem: Wound/ / Incision Healing  Goal: Patient's wound/surgical incision will decrease in size and heals properly  Outcome: Progressing

## 2023-03-20 NOTE — PROGRESS NOTES
S/p left hand I&D and pinning  Pain controlled  Ok for discharge, follow up with Dr Bailon later this week vs next week      Tyler Dumont MD  Orthopedic Trauma Surgery

## 2023-03-20 NOTE — CARE PLAN
The patient is Stable - Low risk of patient condition declining or worsening    Shift Goals  Clinical Goals: Safety  Patient Goals: Rest    Progress made toward(s) clinical / shift goals:      Problem: Pain - Standard  Goal: Alleviation of pain or a reduction in pain to the patient’s comfort goal  Outcome: Progressing  Flowsheets (Taken 3/19/2023 1939)  Pain Rating Scale (NPRS): 3  Note: Declines pharmacological interventions at this time. Will continue to assess and treat accordingly.     Problem: Skin Integrity  Goal: Skin integrity is maintained or improved  Outcome: Progressing  Note: Appropriate interventions in place to maintain/ improve skin integrity.

## 2023-03-20 NOTE — FACE TO FACE
Face to Face Supporting Documentation - Home Health    The encounter with this patient was in whole or in part the primary reason for home health admission.    Date of encounter:   Patient:                    MRN:                       YOB: 2024  Letitia Ghosh  [id number]  1945     Home health to see patient for:  Skilled Nursing care for assessment, interventions & education, Physical Therapy evaluation and treatment, and Occupational therapy evaluation and treatment    Skilled need for:  Exacerbation of Chronic Disease State med management    Skilled nursing interventions to include:  Wound Care    Homebound status evidenced by:  Need the aid of supportive devices such as crutches, canes, wheelchairs or walkers. Leaving home requires a considerable and taxing effort. There is a normal inability to leave the home.    Community Physician to provide follow up care: Les Mendosa D.O.     Optional Interventions? No      I certify the face to face encounter for this home health care referral meets the CMS requirements and the encounter/clinical assessment with the patient was, in whole, or in part, for the medical condition(s) listed above, which is the primary reason for home health care. Based on my clinical findings: the service(s) are medically necessary, support the need for home health care, and the homebound criteria are met.  I certify that this patient has had a face to face encounter by myself.  Alo Garcia M.D. - NPI: [id number]

## 2023-03-20 NOTE — PROGRESS NOTES
[facility] Medicine Daily Progress Note    Date of Service  3/20/2023    Chief Complaint  Letitia Ghosh is a 77 y.o. female admitted 3/18/2023 with   Chief Complaint   Patient presents with    Dog Bite         Hospital Course  This is 77-year-old female with a past medical history significant for hypothyroidism presented to the ER on 3/18/2023 after she had a dog bite.  Patient report that she had had 2 dogs, and they were fighting; when she tried to break their fight; she got injured in right arm.    Imaging showed open comminuted fracture of the distal left third metacarpal with displacement and disruption of the joint with adjacent soft tissue.  Orthopedic surgery was consulted, patient underwent left hand metacarpal percutaneous pinning; left hand complex wound closure Dr. Tyler Dumont on 3/19/2023.  Per orthopedic surgery, nonweightbearing in the left hand.     We will continue IV Unasyn    Interval events:  -- No acute events overnight, vital signs been stable, patient is alert, awake, answering questions appropriately.  Potassium noted to be 2.9 with repeat oral potassium, repeat BMP at a.m.  Magnesium at 1.8, will provide 2 g of IV magnesium  -- MRSA PCR negative.  -- Patient underwent surgery today, will follow surgery recommendation, nonweightbearing left hand  Multimodal pain management, PT/OT    3/20:  -- No acute events overnight, vital sign has been stable, patient is alert, awake, answering questions appropriately, she has been requiring 0.5 L of oxygen  Phosphorus is noted to be low, will provide IV phosphorus.  --Orthopedic surgery evaluate the patient, patient underwent left hand metacarpal percutaneous pinning, left hand complex wound closure by Dr. Dumont on 3/19/2023.  Will need PT/OT.  Per orthopedic surgery, patient not weightbearing on the left hand.  -- will continue IV abx  -- I reached out to Dr. Staley of plastic surgery,, updated patient's being here in the hospital.  Our office  will call her and schedule an appointment as an outpatient.   -- We will need PT/OT    I have discussed this patient's plan of care and discharge plan at IDT rounds today with Case Management, Nursing, Nursing leadership, and other members of the IDT team.    Consultants/Specialty  orthopedics    Code Status  DNAR/DNI    Disposition  Patient is not medically cleared for discharge.   Anticipate discharge to to home with close outpatient follow-up.  I have placed the appropriate orders for post-discharge needs.    Review of Systems  Review of Systems   Constitutional:  Positive for malaise/fatigue. Negative for fever.   HENT:  Negative for congestion and sore throat.    Eyes:  Negative for blurred vision.   Respiratory:  Negative for cough and hemoptysis.    Cardiovascular:  Negative for chest pain and palpitations.   Gastrointestinal:  Negative for abdominal pain, heartburn and vomiting.   Musculoskeletal:  Positive for joint pain.   Neurological:  Negative for tingling and focal weakness.   Psychiatric/Behavioral:  The patient is nervous/anxious.    All other systems reviewed and are negative.     Physical Exam  Temp:  [36.4 °C (97.5 °F)-37.7 °C (99.9 °F)] 37.7 °C (99.9 °F)  Pulse:  [] 89  Resp:  [15-20] 15  BP: (103-132)/(50-61) 118/55  SpO2:  [90 %-98 %] 92 %    Physical Exam  Vitals and nursing note reviewed.   Constitutional:       Appearance: Normal appearance.   HENT:      Head: Normocephalic and atraumatic.   Eyes:      Extraocular Movements: Extraocular movements intact.      Pupils: Pupils are equal, round, and reactive to light.   Cardiovascular:      Rate and Rhythm: Normal rate.   Abdominal:      Palpations: Abdomen is soft.   Musculoskeletal:      Cervical back: Neck supple.      Comments: Wound in her left hand, wrapped in dressing  Multiple bite wounds on her left leg   Neurological:      Mental Status: She is alert and oriented to person, place, and time.      Cranial Nerves: No cranial nerve  deficit.       Fluids    Intake/Output Summary (Last 24 hours) at 3/20/2023 1039  Last data filed at 3/19/2023 1232  Gross per 24 hour   Intake 500 ml   Output 15 ml   Net 485 ml         Laboratory  Recent Labs     03/18/23  1720 03/19/23  0422 03/20/23  0038   WBC 10.3 8.5 7.8   RBC 4.65 4.04* 4.05*   HEMOGLOBIN 13.5 11.9* 11.8*   HEMATOCRIT 41.3 35.2* 36.0*   MCV 88.8 87.1 88.9   MCH 29.0 29.5 29.1   MCHC 32.7* 33.8 32.8*   RDW 41.2 41.1 43.0   PLATELETCT 387 316 307   MPV 9.5 9.9 9.9       Recent Labs     03/18/23  1720 03/19/23  0422 03/20/23  0038   SODIUM 140 139 137   POTASSIUM 2.8* 2.9* 4.5   CHLORIDE 101 102 103   CO2 23 26 25   GLUCOSE 121* 109* 195*   BUN 18 12 15   CREATININE 0.58 0.45* 0.49*   CALCIUM 8.9 7.9* 7.7*       Recent Labs     03/18/23  1720   APTT 29.4   INR 1.03                 Imaging  DX-HAND 3+ LEFT   Final Result      Fourth metacarpal K wires with improved alignment of displaced head fragments      DX-PORTABLE FLUORO > 1 HOUR   Final Result      Portable fluoroscopy utilized for 25.6 seconds.         INTERPRETING LOCATION: [address] [place] [address], [facility]      DX-CHEST-PORTABLE (1 VIEW)   Final Result      No acute cardiopulmonary disease evident.      DX-HAND 3+ LEFT   Final Result      1.  Open comminuted fracture of the distal LEFT 3rd metacarpal with displacement of the head fragment dorsally and disruption of the joint with adjacent soft tissue gas.   2.  Probable soft tissue gas at the radial aspect of the wrist.   3.  No dislocation.   4.  Osteoarthritis.             Assessment/Plan  * Dog bite of hand, initial encounter- (present on admission)  Assessment & Plan  Presents for dog bite earlier today; with exposed left hand tendon and skin flap; 3rd metacarpal fracture on xray   -- Blood CX X 2  ordered, pending MRSA nasal negative   --Orthopedic surgery was consulted, patient underwent left hand metacarpal percutaneous pinning; left hand complex wound closure Dr. Tyler Dumont on  3/19/2023.  Per orthopedic surgery, nonweightbearing in the left hand.   -- Continue unasyn   PT and OT     Hypophosphatemia  Assessment & Plan  Will provide IV phos    Advance care planning  Assessment & Plan  Patient was of sound mind and voluntarily participated in the conversation.   Patient was present for the conversation.  I discussed advance care planning face to face with the patient for at least 17 minutes, including diagnosis, prognosis, plan of care, risks and benefits of any therapies that could be offered, as well as alternatives including palliation.    Patient stated DNAR/DNI    Hypokalemia  Assessment & Plan  Potassium 4.5 monitor    Hypothyroidism  Assessment & Plan  Continue home thyroid supplements         VTE prophylaxis: Lovenox  I have performed a physical exam and reviewed and updated ROS and Plan today (3/20/2023). In review of yesterday's note (3/19/2023), there are no changes except as documented above.

## 2023-03-21 ENCOUNTER — PHARMACY VISIT (OUTPATIENT)
Dept: PHARMACY | Facility: MEDICAL CENTER | Age: 78
End: 2023-03-21
Payer: COMMERCIAL

## 2023-03-21 VITALS
TEMPERATURE: 98.1 F | OXYGEN SATURATION: 96 % | HEIGHT: 60 IN | DIASTOLIC BLOOD PRESSURE: 60 MMHG | RESPIRATION RATE: 17 BRPM | HEART RATE: 98 BPM | WEIGHT: 126 LBS | BODY MASS INDEX: 24.74 KG/M2 | SYSTOLIC BLOOD PRESSURE: 118 MMHG

## 2023-03-21 LAB
ALBUMIN SERPL BCP-MCNC: 3.1 G/DL (ref 3.2–4.9)
BUN SERPL-MCNC: 12 MG/DL (ref 8–22)
CALCIUM ALBUM COR SERPL-MCNC: 7.8 MG/DL (ref 8.5–10.5)
CALCIUM SERPL-MCNC: 7.1 MG/DL (ref 8.5–10.5)
CHLORIDE SERPL-SCNC: 100 MMOL/L (ref 96–112)
CO2 SERPL-SCNC: 24 MMOL/L (ref 20–33)
CREAT SERPL-MCNC: 0.32 MG/DL (ref 0.5–1.4)
ERYTHROCYTE [DISTWIDTH] IN BLOOD BY AUTOMATED COUNT: 44.1 FL (ref 35.9–50)
GFR SERPLBLD CREATININE-BSD FMLA CKD-EPI: 106 ML/MIN/1.73 M 2
GLUCOSE SERPL-MCNC: 104 MG/DL (ref 65–99)
HCT VFR BLD AUTO: 34.5 % (ref 37–47)
HGB BLD-MCNC: 11.5 G/DL (ref 12–16)
MCH RBC QN AUTO: 29.8 PG (ref 27–33)
MCHC RBC AUTO-ENTMCNC: 33.3 G/DL (ref 33.6–35)
MCV RBC AUTO: 89.4 FL (ref 81.4–97.8)
PHOSPHATE SERPL-MCNC: 2.3 MG/DL (ref 2.5–4.5)
PLATELET # BLD AUTO: 302 K/UL (ref 164–446)
PMV BLD AUTO: 10.1 FL (ref 9–12.9)
POTASSIUM SERPL-SCNC: 3.8 MMOL/L (ref 3.6–5.5)
RBC # BLD AUTO: 3.86 M/UL (ref 4.2–5.4)
SODIUM SERPL-SCNC: 136 MMOL/L (ref 135–145)
WBC # BLD AUTO: 10.4 K/UL (ref 4.8–10.8)

## 2023-03-21 PROCEDURE — A9270 NON-COVERED ITEM OR SERVICE: HCPCS | Performed by: STUDENT IN AN ORGANIZED HEALTH CARE EDUCATION/TRAINING PROGRAM

## 2023-03-21 PROCEDURE — 99239 HOSP IP/OBS DSCHRG MGMT >30: CPT | Performed by: INTERNAL MEDICINE

## 2023-03-21 PROCEDURE — 700102 HCHG RX REV CODE 250 W/ 637 OVERRIDE(OP): Performed by: INTERNAL MEDICINE

## 2023-03-21 PROCEDURE — RXMED WILLOW AMBULATORY MEDICATION CHARGE: Performed by: INTERNAL MEDICINE

## 2023-03-21 PROCEDURE — 97166 OT EVAL MOD COMPLEX 45 MIN: CPT

## 2023-03-21 PROCEDURE — 85027 COMPLETE CBC AUTOMATED: CPT

## 2023-03-21 PROCEDURE — A9270 NON-COVERED ITEM OR SERVICE: HCPCS | Performed by: HOSPITALIST

## 2023-03-21 PROCEDURE — 700102 HCHG RX REV CODE 250 W/ 637 OVERRIDE(OP): Performed by: HOSPITALIST

## 2023-03-21 PROCEDURE — A9270 NON-COVERED ITEM OR SERVICE: HCPCS | Performed by: INTERNAL MEDICINE

## 2023-03-21 PROCEDURE — 80069 RENAL FUNCTION PANEL: CPT

## 2023-03-21 PROCEDURE — 700102 HCHG RX REV CODE 250 W/ 637 OVERRIDE(OP): Performed by: STUDENT IN AN ORGANIZED HEALTH CARE EDUCATION/TRAINING PROGRAM

## 2023-03-21 PROCEDURE — 97535 SELF CARE MNGMENT TRAINING: CPT

## 2023-03-21 RX ORDER — HYDROMORPHONE HYDROCHLORIDE 2 MG/1
2-4 TABLET ORAL EVERY 8 HOURS PRN
Qty: 9 TABLET | Refills: 0 | Status: SHIPPED | OUTPATIENT
Start: 2023-03-21 | End: 2023-03-24

## 2023-03-21 RX ORDER — POLYETHYLENE GLYCOL 3350 17 G/17G
17 POWDER, FOR SOLUTION ORAL
Refills: 3 | COMMUNITY
Start: 2023-03-21 | End: 2023-05-02

## 2023-03-21 RX ORDER — ACETAMINOPHEN 325 MG/1
650 TABLET ORAL EVERY 6 HOURS PRN
Qty: 30 TABLET | Refills: 0 | COMMUNITY
Start: 2023-03-21 | End: 2025-02-11

## 2023-03-21 RX ORDER — AMOXICILLIN 250 MG
2 CAPSULE ORAL 2 TIMES DAILY
Qty: 30 TABLET | Refills: 0 | COMMUNITY
Start: 2023-03-21 | End: 2023-05-02

## 2023-03-21 RX ADMIN — LIOTHYRONINE SODIUM 37.5 MCG: 25 TABLET ORAL at 06:28

## 2023-03-21 RX ADMIN — AMOXICILLIN AND CLAVULANATE POTASSIUM 1 TABLET: 875; 125 TABLET, FILM COATED ORAL at 00:57

## 2023-03-21 RX ADMIN — AMOXICILLIN AND CLAVULANATE POTASSIUM 1 TABLET: 875; 125 TABLET, FILM COATED ORAL at 10:11

## 2023-03-21 RX ADMIN — LEVOTHYROXINE SODIUM 125 MCG: 0.12 TABLET ORAL at 06:28

## 2023-03-21 RX ADMIN — ACETAMINOPHEN 650 MG: 325 TABLET, FILM COATED ORAL at 00:56

## 2023-03-21 ASSESSMENT — COGNITIVE AND FUNCTIONAL STATUS - GENERAL
MOVING TO AND FROM BED TO CHAIR: A LOT
DAILY ACTIVITIY SCORE: 16
MOVING FROM LYING ON BACK TO SITTING ON SIDE OF FLAT BED: A LITTLE
SUGGESTED CMS G CODE MODIFIER MOBILITY: CK
STANDING UP FROM CHAIR USING ARMS: A LITTLE
MOBILITY SCORE: 18
DAILY ACTIVITIY SCORE: 24
TURNING FROM BACK TO SIDE WHILE IN FLAT BAD: A LITTLE
DRESSING REGULAR UPPER BODY CLOTHING: A LOT
SUGGESTED CMS G CODE MODIFIER DAILY ACTIVITY: CK
CLIMB 3 TO 5 STEPS WITH RAILING: A LITTLE
HELP NEEDED FOR BATHING: A LOT
DRESSING REGULAR LOWER BODY CLOTHING: A LOT
EATING MEALS: A LITTLE
PERSONAL GROOMING: A LITTLE
SUGGESTED CMS G CODE MODIFIER DAILY ACTIVITY: CH

## 2023-03-21 ASSESSMENT — PAIN DESCRIPTION - PAIN TYPE: TYPE: ACUTE PAIN;SURGICAL PAIN

## 2023-03-21 ASSESSMENT — ACTIVITIES OF DAILY LIVING (ADL): TOILETING: INDEPENDENT

## 2023-03-21 NOTE — DISCHARGE PLANNING
Care Transition Team Assessment    Information Source  Orientation Level: Oriented X4  Information Given By: Patient  Informant's Name: Letitia Ghosh  Who is responsible for making decisions for patient? : Patient    Readmission Evaluation  Is this a readmission?: No    Elopement Risk  Legal Hold: No  Ambulatory or Self Mobile in Wheelchair: Yes  Disoriented: No  Psychiatric Symptoms: None  History of Wandering: No  Elopement this Admit: No  Vocalizing Wanting to Leave: No  Displays Behaviors, Body Language Wanting to Leave: No-Not at Risk for Elopement  Elopement Risk: Not at Risk for Elopement    Interdisciplinary Discharge Planning  Lives with - Patient's Self Care Capacity: Alone and Able to Care For Self  Patient or legal guardian wants to designate a caregiver: No  Support Systems: Family Member(s), Friends / Neighbors  Housing / Facility: [address] [place] [address]  Prior Services: Home-Independent  Durable Medical Equipment: Not Applicable    Discharge Preparedness  What is your plan after discharge?: Home with help  What are your discharge supports?: Other (comment) (Friends for support)  Prior Functional Level: Ambulatory, Independent with Activities of Daily Living  Difficulity with ADLs: None  Difficulity with IADLs: None    Functional Assesment  Prior Functional Level: Ambulatory, Independent with Activities of Daily Living    Finances  Financial Barriers to Discharge: No  Prescription Coverage: Yes ([facility] in [place], [place])    Vision / Hearing Impairment  Vision Impairment : No  Hearing Impairment : Yes  Hearing Impairment: Both Ears         Advance Directive  Advance Directive?: Living Will    Domestic Abuse  Have you ever been the victim of abuse or violence?: No  Physical Abuse or Sexual Abuse: No  Verbal Abuse or Emotional Abuse: No  Possible Abuse/Neglect Reported to:: Not Applicable    Psychological Assessment  History of Substance Abuse: None  History of Psychiatric Problems: No    Discharge Risks  or Barriers  Discharge risks or barriers?: No    Anticipated Discharge Information  Discharge Disposition: Discharged to home/self care (01)  Discharge Address: [address] [place]  [address]  Discharge Contact Phone Number: [phone]    CM met with the patient at the bedside and per the patient she lives alone, bth will be transported by her friends terri Collier or Corinne Floyder on discharge.   Per the patient she has never used a HHC service and she  does not use any DME's at home.   ~ Her PCP is [facility].  ~ Per the patient she does not smoke or drink.   ~ Patient is for discharge today. HH order however the patient is not home bound.   Patient has a f/u appointment with Dr. Fletcher in one week.   No discharge concerns or needs voiced.   IMM letter was given and signed.

## 2023-03-21 NOTE — CARE PLAN
The patient is Stable - Low risk of patient condition declining or worsening    Shift Goals  Clinical Goals: PT/OT  Patient Goals: go home  Family Goals: not present    Progress made toward(s) clinical / shift goals:  yes    Problem: Knowledge Deficit - Standard  Goal: Patient and family/care givers will demonstrate understanding of plan of care, disease process/condition, diagnostic tests and medications  Outcome: Progressing     Problem: Respiratory  Goal: Patient will achieve/maintain optimum respiratory ventilation and gas exchange  Outcome: Progressing     Problem: Infection - Standard  Goal: Patient will remain free from infection  Outcome: Progressing  Flowsheets (Taken 3/19/2023 1818)  Standard Infection Interventions:   Assessed for signs and symptoms of infection   Implemented standard precautions   Instructed patient/family on signs and symptoms of infection   Provided education on proper hand hygiene and infection prevention measures   Assessed for removal IV, central lines, intra-arterial or urinary catheters     Problem: Wound/ / Incision Healing  Goal: Patient's wound/surgical incision will decrease in size and heals properly  Outcome: Progressing     Problem: Skin Integrity  Goal: Skin integrity is maintained or improved  Outcome: Progressing     Problem: Mobility  Goal: Patient's capacity to carry out activities will improve  Outcome: Progressing  Flowsheets (Taken 3/21/2023 1035)  Mobility:   Encouraged mobilization per interdisciplinary team recommendations   Monitored for signs of activity intolerance   Provided assistive devices   Provided rest periods between activities   Administered pain management to allow progressive mobilization   Collaborated with PT/OT

## 2023-03-21 NOTE — THERAPY
Occupational Therapy   Initial Evaluation     Patient Name: Letitia Ghosh  Age:  77 y.o., Sex:  female  Medical Record #: [id number]  Today's Date: 3/21/2023     Precautions: (P) Non Weight Bearing Left Upper Extremity    Assessment  Patient is 77 y.o. female admitted after dog bite to the hand with L 3rd metacarpal fx now s/p I&D and pinning. Pt seen for OT evaluation and presented at modified independent level for self cares and mobility with mod verbal cues. Provided education on WB status and compensatory strategies for ADLs following WB precautions. Pt receptive and agreeable. Pt independent and lives alone but has neighbors that are supportive. No further OT needs, anticipate DC home.    Patient will not be actively followed for occupational therapy services at this time, however may be seen if requested by physician for 1 more visit within 30 days to address any discharge or equipment needs.       Plan    Occupational Therapy Initial Treatment Plan   Duration: (P) Evaluation only    DC Equipment Recommendations: (P) None  Discharge Recommendations: (P) Anticipate that the patient will have no further occupational therapy needs after discharge from the hospital      03/21/23 6461   Prior Living Situation   Prior Services Home-Independent   Housing / Facility [facility]   Bathroom Set up Walk In Shower;Shower Chair   Equipment Owned Tub / Shower Seat   Lives with - Patient's Self Care Capacity Alone and Able to Care For Self   Comments Lives in [place]. Reports neighbors can assist with IADLs as needed. She lives alone independently with her dogs   Prior Level of ADL Function   Self Feeding Independent   Grooming / Hygiene Independent   Bathing Independent   Dressing Independent   Toileting Independent   Prior Level of IADL Function   Medication Management Independent   Laundry Independent   Kitchen Mobility Independent   Finances Independent   Home Management Independent   Shopping Independent    Prior Level Of Mobility Independent Without Device in Community   Driving / Transportation Driving Independent   Precautions   Precautions Non Weight Bearing Left Upper Extremity   Cognition    Cognition / Consciousness WDL   Comments pleasant and receptive   Active ROM Upper Body   Active ROM Upper Body  X   Comments L hand and wrist ROM limited by fx and sx   Strength Upper Body   Upper Body Strength  X   Comments L limited by precautions, used it functionally for light ADLs   Balance Assessment   Sitting Balance (Static) Fair +   Sitting Balance (Dynamic) Fair +   Standing Balance (Static) Fair +   Standing Balance (Dynamic) Fair +   Weight Shift Sitting Good   Weight Shift Standing Good   Comments without AD   Bed Mobility    Supine to Sit Modified Independent   Scooting Modified Independent   ADL Assessment   Grooming Modified Independent;Standing   Upper Body Dressing Modified Independent   Lower Body Dressing Modified Independent   Toileting Modified Independent   How much help from another person does the patient currently need...   6 Clicks Daily Activity Score 24   Functional Mobility   Sit to Stand Modified Independent   Bed, Chair, Wheelchair Transfer Modified Independent   Toilet Transfers Modified Independent   Patient / Family Goals   Patient / Family Goal #1 to go home   Education Group   Education Provided Weight Bearing Precautions;Activities of Daily Living   Role of Occupational Therapist Patient Response Patient;Acceptance;Explanation   ADL Patient Response Patient;Acceptance;Explanation   Weight Bearing Precautions Patient Response Patient;Acceptance;Explanation;Action Demonstration   Occupational Therapy Initial Treatment Plan    Duration Evaluation only   Anticipated Discharge Equipment and Recommendations   DC Equipment Recommendations None   Discharge Recommendations Anticipate that the patient will have no further occupational therapy needs after discharge from the hospital

## 2023-03-21 NOTE — THERAPY
Physical Therapy   Consult     Patient Name: Letitia Ghosh  Age:  77 y.o., Sex:  female  Medical Record #: [id number]  Today's Date: 3/21/2023     Precautions  Precautions: Non Weight Bearing Left Upper Extremity    Assessment  Patient is 77 y.o. female s/p L hand per pinning after sustaining a dog bite. Pt denies any difficulties with functional mobility currently, and denied anticipating any difficulties upon returning home. PT eval not warranted at this time, and pt in agreement.     Plan         DC Equipment Recommendations: None  Discharge Recommendations: Anticipate that the patient will have no further physical therapy needs after discharge from the hospital       Subjective  Pt reported getting around just fine, and denied anticipating any difficulties upon returning home. Pt reported having good support available.      Objective       03/21/23 0915   Precautions   Precautions Other (See Comments)  (NWB L hand)   Prior Level of Functional Mobility   Bed Mobility Independent   Transfer Status Independent   Ambulation Independent   Ambulation Distance   (community)   Assistive Devices Used None   History of Falls   History of Falls No   Cognition    Level of Consciousness Alert   Functional Mobility   Comments Pt up in chair prior to arrival. Pt denied any difficulties getting in/out of bed, and walking around the room   Education Group   Education Provided Role of Physical Therapist   Role of Physical Therapist Patient Response Verbal Demonstration   Additional Comments Educated on WB precautions, and role of PT. Discussed current level of function, and home environment. Pt denied anticipating any difficulties upon returning home or having any concerns.   Anticipated Discharge Equipment and Recommendations   DC Equipment Recommendations None   Discharge Recommendations Anticipate that the patient will have no further physical therapy needs after discharge from the hospital   Interdisciplinary Plan of Care  Collaboration   IDT Collaboration with  Nursing;Occupational Therapist   Patient Position at End of Therapy Seated;Call Light within Reach

## 2023-03-21 NOTE — PROGRESS NOTES
Bedside report received. Assumed care of patient this PM 3/20. Assessment completed      Patient is A&O x 4, pt calls for assistance appropriately  Reports 0 /10 pain, no prn medication administered. Pt states that tyelTylenolnol is her preferred pain mgmt medication.   Pt is r/a  No N/V  No SOB or difficulty breathing.   Mobility SBA, LUE NWB   Bed alarm in off, pt is not a risk for falls.  Voiding + bathroom  Patients that her BM pattern is within her home pattern.            Plan of care reviewed with the patient. Bed is locked and in the lowest position. Call light is within reach. Patient encouraged to voice needs and concerns, all needs met at this time. Hourly rounding in place.      <<-----Click here for Discharge Medication Review

## 2023-08-17 NOTE — DISCHARGE SUMMARY
Discharge Summary    CHIEF COMPLAINT ON ADMISSION  Chief Complaint   Patient presents with    Dog Bite       Reason for Admission  EMS     Admission Date  3/18/2023    CODE STATUS  Prior    HPI & HOSPITAL COURSE  This is a 77 y.o. female here with Dog Bite  Please review Dr. Alo Garcia M.D. notes for further details of history of present illness, past medical/social/family histories, allergies and medications. Please review Dr. Dumont, Orthopedics consultation notes.  She has a history of hypothyroidism. She got bit by her dog on her R arm and leg after trying to separate 2 (domesticated) dogs from fighting.   At the ED, afebrile, hemodynamically stable.  CXR looks clear.  XR hand:  1.  Open comminuted fracture of the distal LEFT 3rd metacarpal with displacement of the head fragment dorsally and disruption of the joint with adjacent soft tissue gas.   2.  Probable soft tissue gas at the radial aspect of the wrist.   3.  No dislocation.   4.  Osteoarthritis.   No leukocytosis. Hypokalemic. Mild hyperglycemia.   She was started on antibiotics. She underwent L hand metacarpal percutaneous pinning and  L hand complex wound closure by Dr. Dumont on 3/19. Dr. Staley, Plastics were called and they recommended outpatient follow up to Plastic Surgery as well. She declined [facility] care and will be discharged home. I gave her a course of antibiotics for dogbite cellulitis. Potassium was replaced and levels normalized.      At discharge date, Letitia Ghosh afebrile and hemodynamically stable.  Letitia Ghosh wanted to be discharged today.    Discharge Physical Exam  General/Constitutional: No acute distress. Elderly   Head: Normocephalic, atraumatic  ENT: Oral mucosa is moist. No obvious pharyngeal exudates  Eyes: Pink conjunctiva, no scleral icterus  Neck: Supple, no lymphadenopathy  Cardiovascular: Normal rate and regular rhythm. S1,2 noted. No murmurs, gallops or rubs.  Pulmonary: Clear to auscultation  bilaterally. No wheezes, rales or rhonchi.  Abdominal: Soft, nontender, not distended, bowel sounds normoactive. No guarding or peritoneal signs.  Musculoskeletal: No tenderness to palpation of chest wall. Hand bandages CDI  Neurologic: Alert and oriented. Grossly nonfocal, moving all extremities.  Genitourinary: No gross hematuria  Skin: No obvious rash.  Psychiatric: Pleasant, cooperative.  Vitals Reviewed  Labs Reviewed  Imaging reviewed  Nursing notes reviewed        Imaging  DX-HAND 3+ LEFT   Final Result      Fourth metacarpal K wires with improved alignment of displaced head fragments      DX-PORTABLE FLUORO > 1 HOUR   Final Result      Portable fluoroscopy utilized for 25.6 seconds.         INTERPRETING LOCATION: [address] [place] [address], [id number]      DX-CHEST-PORTABLE (1 VIEW)   Final Result      No acute cardiopulmonary disease evident.      DX-HAND 3+ LEFT   Final Result      1.  Open comminuted fracture of the distal LEFT 3rd metacarpal with displacement of the head fragment dorsally and disruption of the joint with adjacent soft tissue gas.   2.  Probable soft tissue gas at the radial aspect of the wrist.   3.  No dislocation.   4.  Osteoarthritis.            Therefore, she is discharged in fair and stable condition to home with close outpatient follow-up.    The patient met 2-midnight criteria for an inpatient stay at the time of discharge.    Discharge Date  3/21/2023    FOLLOW UP ITEMS POST DISCHARGE      DISCHARGE DIAGNOSES  Principal Problem:    Dog bite of hand, initial encounter POA: Yes  Active Problems:    Hypothyroidism POA: Unknown    Dog bite of hand without complication, left, sequela POA: Yes    Hypokalemia POA: Unknown    Advance care planning POA: Unknown    Hypophosphatemia POA: Unknown        FOLLOW UP  Future Appointments   Date Time Provider Department Center   8/21/2023 10:00 AM [facility] None     Les Mendosa D.O.  [address] [place]  [address]  [phone]    Follow up    Follow up with Dr. Dumont, orthopedics in 1 week postopvisit Follow up with Dr. Staley, Plastic Surgery in 1 week Follow up with Les Mendosa D.O. in 1 week NURSING provide resources/pamphlets for Postop instructions (surgeon to provide, NWB hand?), narctoic use (avoid swimming or driving; bowel regimen), antibiotic use.    MEDICATIONS ON DISCHARGE     Medication List        START taking these medications        Instructions   acetaminophen 325 MG Tabs  Commonly known as: Tylenol   Take 2 Tablets by mouth every 6 hours as needed for Fever, Moderate Pain or Mild Pain.  Dose: 650 mg     amoxicillin-clavulanate 875-125 MG Tabs  Commonly known as: AUGMENTIN   Take 1 Tablet by mouth every 12 hours for 7 days.  Dose: 1 Tablet     HYDROmorphone 2 MG Tabs  Commonly known as: DILAUDID   Take 1-2 Tablets by mouth every 8 hours as needed for Severe Pain for up to 3 days.  Dose: 2-4 mg     polyethylene glycol/lytes 17 g Pack  Commonly known as: MIRALAX   Take 1 Packet by mouth 1 time a day as needed (if sennosides and docusate ineffective after 24 hours).  Dose: 17 g     senna-docusate 8.6-50 MG Tabs  Commonly known as: PERICOLACE or SENOKOT S   Take 2 Tablets by mouth 2 times a day.  Dose: 2 Tablet            CHANGE how you take these medications        Instructions   DULoxetine 30 MG Cpep  What changed: when to take this  Commonly known as: CYMBALTA   Doctor's comments: Patient needs appointment for any further refills  TAKE ONE CAPSULE BY MOUTH DAILY            CONTINUE taking these medications        Instructions   levothyroxine 125 MCG Tabs  Commonly known as: SYNTHROID   Take 125 mcg by mouth every morning on an empty stomach.  Dose: 125 mcg     * liothyronine 5 MCG Tabs  Commonly known as: CYTOMEL   Take 15 mcg by mouth every day. Take with liothyronine 25 mcg = 40 mcg    Liothyronine 5 mcg ( x 3 tabs) = 15 mcg  Dose: 15 mcg     * liothyronine 25 MCG Tabs  Commonly known as:  CYTOMEL   Take 25 mcg by mouth every day. Take with liothyronine 5 mcg ( x 3 tabs) = total dose 40 mcg  Dose: 25 mcg     meloxicam 15 MG tablet  Commonly known as: MOBIC   Take 1 Tablet by mouth every day.  Dose: 15 mg           * This list has 2 medication(s) that are the same as other medications prescribed for you. Read the directions carefully, and ask your doctor or other care provider to review them with you.                  Allergies  Allergies   Allergen Reactions    Celebrex [Celecoxib] Itching     After one dose      Codeine      Hallucinate      Contrast Media With Iodine [Iodine] Rash     Rash  Pt states she had reaction even after pre-medication protocol    Gadolinium Derivatives Rash     Pt states she turned brilliant red and hot. This was used prior to hip injection.     States she has reaction even after premedication protocol.    Latex Rash     Rash and blisters    Percocet [Oxycodone-Acetaminophen]      Hallucinate    Sulfa Drugs Itching    Tape Rash     Rash and blisters.  Paper tape is OK    Z-Pack [Azithromycin] Vomiting    Hydrocodone      Hallucinations        DIET  No orders of the defined types were placed in this encounter.      ACTIVITY  Nonweightbearing on the affected hand per Orthopedics    CONSULTATIONS  Orthopedics    PROCEDURES  Please see Dr. Dumont's OP NOTE    LABORATORY  Lab Results   Component Value Date    SODIUM 136 03/21/2023    POTASSIUM 3.8 03/21/2023    CHLORIDE 100 03/21/2023    CO2 24 03/21/2023    GLUCOSE 104 (H) 03/21/2023    BUN 12 03/21/2023    CREATININE 0.32 (L) 03/21/2023        Lab Results   Component Value Date    WBC 10.4 03/21/2023    HEMOGLOBIN 11.5 (L) 03/21/2023    HEMATOCRIT 34.5 (L) 03/21/2023    PLATELETCT 302 03/21/2023        Total time of the discharge process exceeds 35 minutes.   Where Do You Want The Question To Include Opioid Counseling Located?: Case Summary Tab

## 2023-11-16 PROBLEM — M16.9 OSTEOARTHRITIS OF HIP: Status: ACTIVE | Noted: 2023-11-16

## 2023-11-20 ASSESSMENT — FIBROSIS 4 INDEX: FIB4 SCORE: 1.42

## 2023-12-05 ENCOUNTER — APPOINTMENT (OUTPATIENT)
Dept: ADMISSIONS | Facility: MEDICAL CENTER | Age: 78
End: 2023-12-05
Attending: ORTHOPAEDIC SURGERY
Payer: MEDICARE

## 2023-12-05 PROBLEM — M16.11 PRIMARY OSTEOARTHRITIS OF RIGHT HIP: Status: ACTIVE | Noted: 2023-11-16

## 2023-12-12 ENCOUNTER — PRE-ADMISSION TESTING (OUTPATIENT)
Dept: ADMISSIONS | Facility: MEDICAL CENTER | Age: 78
End: 2023-12-12
Attending: ORTHOPAEDIC SURGERY
Payer: MEDICARE

## 2023-12-12 VITALS — HEIGHT: 60 IN | BODY MASS INDEX: 24.33 KG/M2

## 2023-12-12 DIAGNOSIS — Z01.812 PRE-OPERATIVE LABORATORY EXAMINATION: ICD-10-CM

## 2023-12-12 NOTE — PREPROCEDURE INSTRUCTIONS
Pre-admit telephone appointment completed. Reviewed the Preparing for your procedure handout with patient over the phone.  Patient instructed per pharmacy guidelines regarding taking, holding, or contacting provider for instructions on regularly prescribed medications before surgery. Instructed to take the following medications the day of surgery with a sip of water per pharmacy medication guidelines: synthroid, vibegron, and if tylenol.

## 2023-12-15 ENCOUNTER — APPOINTMENT (OUTPATIENT)
Dept: ADMISSIONS | Facility: MEDICAL CENTER | Age: 78
End: 2023-12-15
Attending: ORTHOPAEDIC SURGERY
Payer: MEDICARE

## 2023-12-15 NOTE — DISCHARGE PLANNING
DISCHARGE PLANNING NOTE - TOTAL JOINT    Procedure: Procedure(s):  ARTHROPLASTY, HIP, TOTAL, ANTERIOR APPROACH  Procedure Date: 12/26/2023  Insurance: Payor: MEDICARE / Plan: MEDICARE PART A & B / Product Type: *No Product type* /    Equipment currently available at home?  front-wheel walker, shower chair, and ice.  Steps into the home? 1  Steps within the home? 0  Toilet height? ADA  Type of shower? walk-in shower  Home Oxygen? No.  Portable tank?    Oxygen Provider:  Who will be with you during your recovery? Friend, imani  Is Outpatient Physical Therapy set up after surgery? Yes  Did you take the Total Joint Class and where? Yes, received NAON book.  Planning same day discharge?No     This writer spoke on the with pt and educated to preop showers, nasal mrsa swab.  CHG kit and all materials mailed to pt. All questions answered and verbalizes understanding of all instructions. No dc needs identified at this time. Anticipate dc to home without barriers.

## 2023-12-21 RX ORDER — DOCUSATE SODIUM 100 MG/1
100 CAPSULE, LIQUID FILLED ORAL 2 TIMES DAILY
Qty: 60 CAPSULE | Refills: 0 | Status: SHIPPED | OUTPATIENT
Start: 2023-12-21 | End: 2024-01-20

## 2023-12-21 RX ORDER — ACETAMINOPHEN 500 MG
1000 TABLET ORAL 3 TIMES DAILY PRN
Qty: 90 TABLET | Refills: 0
Start: 2023-12-21 | End: 2024-01-20

## 2023-12-21 RX ORDER — TRAMADOL HYDROCHLORIDE 50 MG/1
50-100 TABLET ORAL EVERY 6 HOURS PRN
Qty: 35 TABLET | Refills: 0 | Status: SHIPPED | OUTPATIENT
Start: 2023-12-21 | End: 2023-12-28

## 2023-12-21 RX ORDER — MELOXICAM 7.5 MG/1
7.5 TABLET ORAL DAILY
Qty: 30 TABLET | Refills: 0 | Status: SHIPPED | OUTPATIENT
Start: 2023-12-21 | End: 2024-01-20

## 2023-12-21 NOTE — DISCHARGE INSTRUCTIONS
What to Expect Post Anesthesia    Rest and take it easy for the first 24 hours.  A responsible adult is recommended to remain with you during that time.  It is normal to feel sleepy.  We encourage you to not do anything that requires balance, judgment or coordination.    FOR 24 HOURS DO NOT:  Drive, operate machinery or run household appliances.  Drink beer or alcoholic beverages.  Make important decisions or sign legal documents.    To avoid nausea, slowly advance diet as tolerated, avoiding spicy or greasy foods for the first day.  Add more substantial food to your diet according to your provider's instructions.  Babies can be fed formula or breast milk as soon as they are hungry.  INCREASE FLUIDS AND FIBER TO AVOID CONSTIPATION.    MILD FLU-LIKE SYMPTOMS ARE NORMAL.  YOU MAY EXPERIENCE GENERALIZED MUSCLE ACHES, THROAT IRRITATION, HEADACHE AND/OR SOME NAUSEA.    If any questions arise, call your provider.  If your provider is not available, please feel free to call the Surgical Center at [phone].    MEDICATIONS: Resume taking daily medication.  Take prescribed pain medication with food.  If no medication is prescribed, you may take non-aspirin pain medication if needed.  PAIN MEDICATION CAN BE VERY CONSTIPATING.  Take a stool softener or laxative such as senokot, pericolace, or milk of magnesia if needed.    Last pain medication given at     Diet  Resume your normal diet as tolerated.  A diet low in cholesterol, fat, and sodium is recommended for good health.     Fay's Instruction  Patient will be discharged home and follow up with Dr. Dodge in 2 weeks, for which the patient already has scheduled.    You may call or text Dr. Dodge' medical assistant during business hours at [phone].  You may call the emergency [facility] line during nights/weekends/holidays if needed at [phone].    Instructions:  -Leave the bandage in place until your followup appointment in 2 weeks.  -May shower with bandage in  place, if bandage becomes soaked underneath please remove and call the office immediately.  -No baths/tubs/pools/submersion of the wound for now.  -Call if there is significant drainage beneath the bandage    -Put as much weight as comfortable on the operative leg.  Use a walker to assist with balance to avoid any falls.  -It is important to start moving and stretching right away, otherwise the joint can stiffen.    -Take your blood clot prevention medication   -Xarelto 10mg, once daily for 21 days  -Use ice frequently to help with pain and swelling control  -Pain management:  Standard pain regimen should be:        -Tylenol 1,000mg three times per day (take this automatically)        -Meloxicam one pill daily (take this automatically)        -Tramadol 1-2 pills every 6 hours (take this automatically to start)        -Oxycodone 1-2 pills as needed IN ADDITION to the meds listed above.  Do not take Oxycodone and Tramadol at the same time (space at least 1 hour apart)        -Try to limit the amount of oxycodone since it tends to cause constipation, drowsiness, etc.  But if you need it, take it.        -100mg of Colace (docusate) will be prescribed. Take this twice a day while still taking Tramadol or Oxycodone. Can discontinue after opiates are no longer being taken.  If bowel movement has not occurred in 48 hours please add in a laxative called Senna (over the counter) twice daily in conjunction with the colace. If no bowel movement is produced 48 hours after adding in Senna please start Milk of Magnesia (over the counter).

## 2023-12-26 ENCOUNTER — HOSPITAL ENCOUNTER (OUTPATIENT)
Facility: MEDICAL CENTER | Age: 78
End: 2023-12-27
Attending: ORTHOPAEDIC SURGERY | Admitting: ORTHOPAEDIC SURGERY
Payer: MEDICARE

## 2023-12-26 ENCOUNTER — APPOINTMENT (OUTPATIENT)
Dept: RADIOLOGY | Facility: MEDICAL CENTER | Age: 78
End: 2023-12-26
Attending: ORTHOPAEDIC SURGERY
Payer: MEDICARE

## 2023-12-26 ENCOUNTER — ANESTHESIA EVENT (OUTPATIENT)
Dept: SURGERY | Facility: MEDICAL CENTER | Age: 78
End: 2023-12-26
Payer: MEDICARE

## 2023-12-26 ENCOUNTER — ANESTHESIA (OUTPATIENT)
Dept: SURGERY | Facility: MEDICAL CENTER | Age: 78
End: 2023-12-26
Payer: MEDICARE

## 2023-12-26 ENCOUNTER — APPOINTMENT (OUTPATIENT)
Dept: RADIOLOGY | Facility: MEDICAL CENTER | Age: 78
End: 2023-12-26
Attending: STUDENT IN AN ORGANIZED HEALTH CARE EDUCATION/TRAINING PROGRAM
Payer: MEDICARE

## 2023-12-26 DIAGNOSIS — M16.11 PRIMARY OSTEOARTHRITIS OF RIGHT HIP: ICD-10-CM

## 2023-12-26 DIAGNOSIS — G89.18 POST-OPERATIVE PAIN: ICD-10-CM

## 2023-12-26 LAB — EKG IMPRESSION: NORMAL

## 2023-12-26 PROCEDURE — 700111 HCHG RX REV CODE 636 W/ 250 OVERRIDE (IP): Performed by: ANESTHESIOLOGY

## 2023-12-26 PROCEDURE — C1713 ANCHOR/SCREW BN/BN,TIS/BN: HCPCS | Performed by: ORTHOPAEDIC SURGERY

## 2023-12-26 PROCEDURE — 502000 HCHG MISC OR IMPLANTS RC 0278: Performed by: ORTHOPAEDIC SURGERY

## 2023-12-26 PROCEDURE — 93010 ELECTROCARDIOGRAM REPORT: CPT | Performed by: INTERNAL MEDICINE

## 2023-12-26 PROCEDURE — C1776 JOINT DEVICE (IMPLANTABLE): HCPCS | Performed by: ORTHOPAEDIC SURGERY

## 2023-12-26 PROCEDURE — 27130 TOTAL HIP ARTHROPLASTY: CPT | Mod: RT | Performed by: ORTHOPAEDIC SURGERY

## 2023-12-26 PROCEDURE — 700102 HCHG RX REV CODE 250 W/ 637 OVERRIDE(OP): Mod: JZ | Performed by: STUDENT IN AN ORGANIZED HEALTH CARE EDUCATION/TRAINING PROGRAM

## 2023-12-26 PROCEDURE — 700111 HCHG RX REV CODE 636 W/ 250 OVERRIDE (IP): Performed by: STUDENT IN AN ORGANIZED HEALTH CARE EDUCATION/TRAINING PROGRAM

## 2023-12-26 PROCEDURE — 160009 HCHG ANES TIME/MIN: Performed by: ORTHOPAEDIC SURGERY

## 2023-12-26 PROCEDURE — 700105 HCHG RX REV CODE 258: Performed by: STUDENT IN AN ORGANIZED HEALTH CARE EDUCATION/TRAINING PROGRAM

## 2023-12-26 PROCEDURE — 700102 HCHG RX REV CODE 250 W/ 637 OVERRIDE(OP): Performed by: ANESTHESIOLOGY

## 2023-12-26 PROCEDURE — 160031 HCHG SURGERY MINUTES - 1ST 30 MINS LEVEL 5: Performed by: ORTHOPAEDIC SURGERY

## 2023-12-26 PROCEDURE — 93005 ELECTROCARDIOGRAM TRACING: CPT | Performed by: ANESTHESIOLOGY

## 2023-12-26 PROCEDURE — 700101 HCHG RX REV CODE 250: Performed by: ANESTHESIOLOGY

## 2023-12-26 PROCEDURE — 700111 HCHG RX REV CODE 636 W/ 250 OVERRIDE (IP): Performed by: ORTHOPAEDIC SURGERY

## 2023-12-26 PROCEDURE — 160035 HCHG PACU - 1ST 60 MINS PHASE I: Performed by: ORTHOPAEDIC SURGERY

## 2023-12-26 PROCEDURE — 160036 HCHG PACU - EA ADDL 30 MINS PHASE I: Performed by: ORTHOPAEDIC SURGERY

## 2023-12-26 PROCEDURE — 160042 HCHG SURGERY MINUTES - EA ADDL 1 MIN LEVEL 5: Performed by: ORTHOPAEDIC SURGERY

## 2023-12-26 PROCEDURE — 160002 HCHG RECOVERY MINUTES (STAT): Performed by: ORTHOPAEDIC SURGERY

## 2023-12-26 PROCEDURE — G0378 HOSPITAL OBSERVATION PER HR: HCPCS

## 2023-12-26 PROCEDURE — A9270 NON-COVERED ITEM OR SERVICE: HCPCS | Mod: JZ | Performed by: STUDENT IN AN ORGANIZED HEALTH CARE EDUCATION/TRAINING PROGRAM

## 2023-12-26 PROCEDURE — A9270 NON-COVERED ITEM OR SERVICE: HCPCS | Performed by: ANESTHESIOLOGY

## 2023-12-26 PROCEDURE — 700111 HCHG RX REV CODE 636 W/ 250 OVERRIDE (IP): Mod: JZ | Performed by: ORTHOPAEDIC SURGERY

## 2023-12-26 PROCEDURE — 94760 N-INVAS EAR/PLS OXIMETRY 1: CPT

## 2023-12-26 PROCEDURE — 770030 HCHG ROOM/CARE - EXTENDED RECOVERY EACH 15 MIN

## 2023-12-26 PROCEDURE — 160048 HCHG OR STATISTICAL LEVEL 1-5: Performed by: ORTHOPAEDIC SURGERY

## 2023-12-26 PROCEDURE — 27130 TOTAL HIP ARTHROPLASTY: CPT | Mod: ASROC,RT | Performed by: STUDENT IN AN ORGANIZED HEALTH CARE EDUCATION/TRAINING PROGRAM

## 2023-12-26 PROCEDURE — 72170 X-RAY EXAM OF PELVIS: CPT

## 2023-12-26 PROCEDURE — 700105 HCHG RX REV CODE 258: Performed by: ORTHOPAEDIC SURGERY

## 2023-12-26 PROCEDURE — 700111 HCHG RX REV CODE 636 W/ 250 OVERRIDE (IP): Mod: JZ | Performed by: ANESTHESIOLOGY

## 2023-12-26 PROCEDURE — 73502 X-RAY EXAM HIP UNI 2-3 VIEWS: CPT | Mod: RT

## 2023-12-26 DEVICE — LINER ACETABULAR DUAL MOBILITY OR3O OD52 MM ID40 MM (1EA): Type: IMPLANTABLE DEVICE | Site: HIP | Status: FUNCTIONAL

## 2023-12-26 DEVICE — IMPLANT R3 3 HOLE ACET SHELL 52MM (1EA): Type: IMPLANTABLE DEVICE | Site: HIP | Status: FUNCTIONAL

## 2023-12-26 DEVICE — STEM POLAR CEMENTLESS STANDARD TI/HA 4: Type: IMPLANTABLE DEVICE | Site: HIP | Status: FUNCTIONAL

## 2023-12-26 DEVICE — IMPLANT OXINIUM FEM HD 12/14 28MM +0 (1EA): Type: IMPLANTABLE DEVICE | Site: HIP | Status: FUNCTIONAL

## 2023-12-26 DEVICE — IMPLANTABLE DEVICE: Type: IMPLANTABLE DEVICE | Site: HIP | Status: FUNCTIONAL

## 2023-12-26 DEVICE — IMPLANT REF SPHER HEAD SCREW 30MM (1EA): Type: IMPLANTABLE DEVICE | Site: HIP | Status: FUNCTIONAL

## 2023-12-26 DEVICE — IMPLANT REF SPHER HEAD SCREW 15MM (1EA): Type: IMPLANTABLE DEVICE | Site: HIP | Status: FUNCTIONAL

## 2023-12-26 RX ORDER — LIOTHYRONINE SODIUM 5 UG/1
15 TABLET ORAL
Status: DISCONTINUED | OUTPATIENT
Start: 2023-12-26 | End: 2023-12-27 | Stop reason: HOSPADM

## 2023-12-26 RX ORDER — SODIUM CHLORIDE, SODIUM LACTATE, POTASSIUM CHLORIDE, CALCIUM CHLORIDE 600; 310; 30; 20 MG/100ML; MG/100ML; MG/100ML; MG/100ML
INJECTION, SOLUTION INTRAVENOUS CONTINUOUS
Status: ACTIVE | OUTPATIENT
Start: 2023-12-26 | End: 2023-12-26

## 2023-12-26 RX ORDER — ACETAMINOPHEN 500 MG
1000 TABLET ORAL EVERY 6 HOURS
Status: DISCONTINUED | OUTPATIENT
Start: 2023-12-26 | End: 2023-12-27 | Stop reason: HOSPADM

## 2023-12-26 RX ORDER — ONDANSETRON 2 MG/ML
4 INJECTION INTRAMUSCULAR; INTRAVENOUS
Status: DISCONTINUED | OUTPATIENT
Start: 2023-12-26 | End: 2023-12-26 | Stop reason: HOSPADM

## 2023-12-26 RX ORDER — EPHEDRINE SULFATE 50 MG/ML
5 INJECTION, SOLUTION INTRAVENOUS
Status: DISCONTINUED | OUTPATIENT
Start: 2023-12-26 | End: 2023-12-26 | Stop reason: HOSPADM

## 2023-12-26 RX ORDER — AMOXICILLIN 250 MG
1 CAPSULE ORAL NIGHTLY
Status: DISCONTINUED | OUTPATIENT
Start: 2023-12-26 | End: 2023-12-27 | Stop reason: HOSPADM

## 2023-12-26 RX ORDER — SCOLOPAMINE TRANSDERMAL SYSTEM 1 MG/1
1 PATCH, EXTENDED RELEASE TRANSDERMAL
Status: DISCONTINUED | OUTPATIENT
Start: 2023-12-26 | End: 2023-12-27 | Stop reason: HOSPADM

## 2023-12-26 RX ORDER — TRAMADOL HYDROCHLORIDE 50 MG/1
50 TABLET ORAL EVERY 4 HOURS PRN
Status: DISCONTINUED | OUTPATIENT
Start: 2023-12-26 | End: 2023-12-27 | Stop reason: HOSPADM

## 2023-12-26 RX ORDER — LIOTHYRONINE SODIUM 5 UG/1
25 TABLET ORAL DAILY
Status: DISCONTINUED | OUTPATIENT
Start: 2023-12-26 | End: 2023-12-26

## 2023-12-26 RX ORDER — EPINEPHRINE 1 MG/ML(1)
AMPUL (ML) INJECTION
Status: DISCONTINUED | OUTPATIENT
Start: 2023-12-26 | End: 2023-12-26 | Stop reason: HOSPADM

## 2023-12-26 RX ORDER — ASPIRIN 81 MG/1
81 TABLET ORAL 2 TIMES DAILY
Status: DISCONTINUED | OUTPATIENT
Start: 2023-12-27 | End: 2023-12-27 | Stop reason: HOSPADM

## 2023-12-26 RX ORDER — POLYETHYLENE GLYCOL 3350 17 G/17G
1 POWDER, FOR SOLUTION ORAL 2 TIMES DAILY PRN
Status: DISCONTINUED | OUTPATIENT
Start: 2023-12-26 | End: 2023-12-27 | Stop reason: HOSPADM

## 2023-12-26 RX ORDER — LIOTHYRONINE SODIUM 25 UG/1
25 TABLET ORAL
Status: DISCONTINUED | OUTPATIENT
Start: 2023-12-26 | End: 2023-12-27 | Stop reason: HOSPADM

## 2023-12-26 RX ORDER — VANCOMYCIN HYDROCHLORIDE 1 G/20ML
INJECTION, POWDER, LYOPHILIZED, FOR SOLUTION INTRAVENOUS
Status: COMPLETED | OUTPATIENT
Start: 2023-12-26 | End: 2023-12-26

## 2023-12-26 RX ORDER — ONDANSETRON 2 MG/ML
INJECTION INTRAMUSCULAR; INTRAVENOUS PRN
Status: DISCONTINUED | OUTPATIENT
Start: 2023-12-26 | End: 2023-12-26 | Stop reason: SURG

## 2023-12-26 RX ORDER — CEFAZOLIN SODIUM 1 G/3ML
2 INJECTION, POWDER, FOR SOLUTION INTRAMUSCULAR; INTRAVENOUS ONCE
Status: COMPLETED | OUTPATIENT
Start: 2023-12-26 | End: 2023-12-26

## 2023-12-26 RX ORDER — ONDANSETRON 2 MG/ML
4 INJECTION INTRAMUSCULAR; INTRAVENOUS EVERY 4 HOURS PRN
Status: DISCONTINUED | OUTPATIENT
Start: 2023-12-26 | End: 2023-12-27 | Stop reason: HOSPADM

## 2023-12-26 RX ORDER — TRAMADOL HYDROCHLORIDE 50 MG/1
50 TABLET ORAL PRN
Status: DISCONTINUED | OUTPATIENT
Start: 2023-12-26 | End: 2023-12-26 | Stop reason: HOSPADM

## 2023-12-26 RX ORDER — SODIUM CHLORIDE, SODIUM LACTATE, POTASSIUM CHLORIDE, CALCIUM CHLORIDE 600; 310; 30; 20 MG/100ML; MG/100ML; MG/100ML; MG/100ML
INJECTION, SOLUTION INTRAVENOUS CONTINUOUS
Status: DISCONTINUED | OUTPATIENT
Start: 2023-12-26 | End: 2023-12-26 | Stop reason: HOSPADM

## 2023-12-26 RX ORDER — KETOROLAC TROMETHAMINE 30 MG/ML
INJECTION, SOLUTION INTRAMUSCULAR; INTRAVENOUS
Status: DISCONTINUED | OUTPATIENT
Start: 2023-12-26 | End: 2023-12-26 | Stop reason: HOSPADM

## 2023-12-26 RX ORDER — KETOROLAC TROMETHAMINE 30 MG/ML
15 INJECTION, SOLUTION INTRAMUSCULAR; INTRAVENOUS EVERY 6 HOURS
Status: DISCONTINUED | OUTPATIENT
Start: 2023-12-26 | End: 2023-12-27 | Stop reason: HOSPADM

## 2023-12-26 RX ORDER — HALOPERIDOL 5 MG/ML
1 INJECTION INTRAMUSCULAR
Status: DISCONTINUED | OUTPATIENT
Start: 2023-12-26 | End: 2023-12-26 | Stop reason: HOSPADM

## 2023-12-26 RX ORDER — DIPHENHYDRAMINE HYDROCHLORIDE 50 MG/ML
25 INJECTION INTRAMUSCULAR; INTRAVENOUS EVERY 6 HOURS PRN
Status: DISCONTINUED | OUTPATIENT
Start: 2023-12-26 | End: 2023-12-27 | Stop reason: HOSPADM

## 2023-12-26 RX ORDER — DEXAMETHASONE SODIUM PHOSPHATE 4 MG/ML
10 INJECTION, SOLUTION INTRA-ARTICULAR; INTRALESIONAL; INTRAMUSCULAR; INTRAVENOUS; SOFT TISSUE ONCE
Status: COMPLETED | OUTPATIENT
Start: 2023-12-27 | End: 2023-12-27

## 2023-12-26 RX ORDER — DULOXETIN HYDROCHLORIDE 30 MG/1
30 CAPSULE, DELAYED RELEASE ORAL NIGHTLY
Status: DISCONTINUED | OUTPATIENT
Start: 2023-12-26 | End: 2023-12-27 | Stop reason: HOSPADM

## 2023-12-26 RX ORDER — ENEMA 19; 7 G/133ML; G/133ML
1 ENEMA RECTAL
Status: DISCONTINUED | OUTPATIENT
Start: 2023-12-26 | End: 2023-12-27 | Stop reason: HOSPADM

## 2023-12-26 RX ORDER — CALCIUM GLUCONATE 94 MG/ML
INJECTION, SOLUTION INTRAVENOUS PRN
Status: DISCONTINUED | OUTPATIENT
Start: 2023-12-26 | End: 2023-12-26 | Stop reason: SURG

## 2023-12-26 RX ORDER — DIPHENHYDRAMINE HCL 25 MG
25 TABLET ORAL NIGHTLY PRN
Status: DISCONTINUED | OUTPATIENT
Start: 2023-12-27 | End: 2023-12-27 | Stop reason: HOSPADM

## 2023-12-26 RX ORDER — HALOPERIDOL 5 MG/ML
1 INJECTION INTRAMUSCULAR EVERY 6 HOURS PRN
Status: DISCONTINUED | OUTPATIENT
Start: 2023-12-26 | End: 2023-12-27 | Stop reason: HOSPADM

## 2023-12-26 RX ORDER — TRIAMTERENE AND HYDROCHLOROTHIAZIDE 37.5; 25 MG/1; MG/1
1 TABLET ORAL
Status: DISCONTINUED | OUTPATIENT
Start: 2023-12-26 | End: 2023-12-27 | Stop reason: HOSPADM

## 2023-12-26 RX ORDER — HYDRALAZINE HYDROCHLORIDE 20 MG/ML
5 INJECTION INTRAMUSCULAR; INTRAVENOUS
Status: DISCONTINUED | OUTPATIENT
Start: 2023-12-26 | End: 2023-12-26 | Stop reason: HOSPADM

## 2023-12-26 RX ORDER — BISACODYL 10 MG
10 SUPPOSITORY, RECTAL RECTAL
Status: DISCONTINUED | OUTPATIENT
Start: 2023-12-26 | End: 2023-12-27 | Stop reason: HOSPADM

## 2023-12-26 RX ORDER — ROCURONIUM BROMIDE 10 MG/ML
INJECTION, SOLUTION INTRAVENOUS PRN
Status: DISCONTINUED | OUTPATIENT
Start: 2023-12-26 | End: 2023-12-26 | Stop reason: SURG

## 2023-12-26 RX ORDER — IBUPROFEN 400 MG/1
800 TABLET ORAL 3 TIMES DAILY PRN
Status: DISCONTINUED | OUTPATIENT
Start: 2023-12-29 | End: 2023-12-27 | Stop reason: HOSPADM

## 2023-12-26 RX ORDER — DOCUSATE SODIUM 100 MG/1
100 CAPSULE, LIQUID FILLED ORAL 2 TIMES DAILY
Status: DISCONTINUED | OUTPATIENT
Start: 2023-12-26 | End: 2023-12-27 | Stop reason: HOSPADM

## 2023-12-26 RX ORDER — DIPHENHYDRAMINE HCL 25 MG
25 TABLET ORAL EVERY 6 HOURS PRN
Status: DISCONTINUED | OUTPATIENT
Start: 2023-12-26 | End: 2023-12-27 | Stop reason: HOSPADM

## 2023-12-26 RX ORDER — MEPERIDINE HYDROCHLORIDE 25 MG/ML
12.5 INJECTION INTRAMUSCULAR; INTRAVENOUS; SUBCUTANEOUS
Status: DISCONTINUED | OUTPATIENT
Start: 2023-12-26 | End: 2023-12-26 | Stop reason: HOSPADM

## 2023-12-26 RX ORDER — EPHEDRINE SULFATE 50 MG/ML
INJECTION, SOLUTION INTRAVENOUS PRN
Status: DISCONTINUED | OUTPATIENT
Start: 2023-12-26 | End: 2023-12-26 | Stop reason: SURG

## 2023-12-26 RX ORDER — ACETAMINOPHEN 500 MG
1000 TABLET ORAL EVERY 6 HOURS PRN
Status: DISCONTINUED | OUTPATIENT
Start: 2023-12-31 | End: 2023-12-27 | Stop reason: HOSPADM

## 2023-12-26 RX ORDER — AMOXICILLIN 250 MG
1 CAPSULE ORAL
Status: DISCONTINUED | OUTPATIENT
Start: 2023-12-26 | End: 2023-12-27 | Stop reason: HOSPADM

## 2023-12-26 RX ORDER — MIDAZOLAM HYDROCHLORIDE 1 MG/ML
1 INJECTION INTRAMUSCULAR; INTRAVENOUS
Status: DISCONTINUED | OUTPATIENT
Start: 2023-12-26 | End: 2023-12-26 | Stop reason: HOSPADM

## 2023-12-26 RX ORDER — LEVOTHYROXINE SODIUM 0.12 MG/1
125 TABLET ORAL
Status: DISCONTINUED | OUTPATIENT
Start: 2023-12-26 | End: 2023-12-27 | Stop reason: HOSPADM

## 2023-12-26 RX ORDER — TRANEXAMIC ACID 100 MG/ML
INJECTION, SOLUTION INTRAVENOUS PRN
Status: DISCONTINUED | OUTPATIENT
Start: 2023-12-26 | End: 2023-12-26 | Stop reason: SURG

## 2023-12-26 RX ORDER — DEXAMETHASONE SODIUM PHOSPHATE 4 MG/ML
4 INJECTION, SOLUTION INTRA-ARTICULAR; INTRALESIONAL; INTRAMUSCULAR; INTRAVENOUS; SOFT TISSUE
Status: DISCONTINUED | OUTPATIENT
Start: 2023-12-26 | End: 2023-12-27 | Stop reason: HOSPADM

## 2023-12-26 RX ORDER — LIDOCAINE HYDROCHLORIDE 20 MG/ML
INJECTION, SOLUTION EPIDURAL; INFILTRATION; INTRACAUDAL; PERINEURAL PRN
Status: DISCONTINUED | OUTPATIENT
Start: 2023-12-26 | End: 2023-12-26 | Stop reason: SURG

## 2023-12-26 RX ORDER — MELOXICAM 7.5 MG/1
7.5 TABLET ORAL DAILY
COMMUNITY
End: 2024-03-18

## 2023-12-26 RX ORDER — ROPIVACAINE HYDROCHLORIDE 5 MG/ML
INJECTION, SOLUTION EPIDURAL; INFILTRATION; PERINEURAL
Status: DISCONTINUED | OUTPATIENT
Start: 2023-12-26 | End: 2023-12-26 | Stop reason: HOSPADM

## 2023-12-26 RX ORDER — POTASSIUM CHLORIDE 20 MEQ/1
10 TABLET, EXTENDED RELEASE ORAL DAILY
Status: DISCONTINUED | OUTPATIENT
Start: 2023-12-26 | End: 2023-12-27 | Stop reason: HOSPADM

## 2023-12-26 RX ADMIN — PROPOFOL 200 MG: 10 INJECTION, EMULSION INTRAVENOUS at 13:05

## 2023-12-26 RX ADMIN — FENTANYL CITRATE 25 MCG: 50 INJECTION, SOLUTION INTRAMUSCULAR; INTRAVENOUS at 14:30

## 2023-12-26 RX ADMIN — ACETAMINOPHEN 1000 MG: 500 TABLET ORAL at 23:44

## 2023-12-26 RX ADMIN — ONDANSETRON 4 MG: 2 INJECTION INTRAMUSCULAR; INTRAVENOUS at 13:05

## 2023-12-26 RX ADMIN — CEFAZOLIN 2 G: 1 INJECTION, POWDER, FOR SOLUTION INTRAMUSCULAR; INTRAVENOUS at 13:05

## 2023-12-26 RX ADMIN — ROCURONIUM BROMIDE 30 MG: 50 INJECTION, SOLUTION INTRAVENOUS at 13:05

## 2023-12-26 RX ADMIN — TRANEXAMIC ACID 1000 MG: 100 INJECTION, SOLUTION INTRAVENOUS at 13:05

## 2023-12-26 RX ADMIN — FENTANYL CITRATE 25 MCG: 50 INJECTION, SOLUTION INTRAMUSCULAR; INTRAVENOUS at 14:20

## 2023-12-26 RX ADMIN — CEFAZOLIN 2 G: 2 INJECTION, POWDER, FOR SOLUTION INTRAMUSCULAR; INTRAVENOUS at 23:44

## 2023-12-26 RX ADMIN — FENTANYL CITRATE 100 MCG: 50 INJECTION, SOLUTION INTRAMUSCULAR; INTRAVENOUS at 13:05

## 2023-12-26 RX ADMIN — CEFAZOLIN 2 G: 2 INJECTION, POWDER, FOR SOLUTION INTRAMUSCULAR; INTRAVENOUS at 17:36

## 2023-12-26 RX ADMIN — POTASSIUM CHLORIDE 10 MEQ: 1500 TABLET, EXTENDED RELEASE ORAL at 17:35

## 2023-12-26 RX ADMIN — FENTANYL CITRATE 25 MCG: 50 INJECTION, SOLUTION INTRAMUSCULAR; INTRAVENOUS at 14:40

## 2023-12-26 RX ADMIN — KETOROLAC TROMETHAMINE 15 MG: 30 INJECTION, SOLUTION INTRAMUSCULAR; INTRAVENOUS at 17:35

## 2023-12-26 RX ADMIN — TRAMADOL HYDROCHLORIDE 50 MG: 50 TABLET ORAL at 14:20

## 2023-12-26 RX ADMIN — SODIUM CHLORIDE, POTASSIUM CHLORIDE, SODIUM LACTATE AND CALCIUM CHLORIDE: 600; 310; 30; 20 INJECTION, SOLUTION INTRAVENOUS at 12:05

## 2023-12-26 RX ADMIN — EPHEDRINE SULFATE 10 MG: 50 INJECTION, SOLUTION INTRAVENOUS at 13:05

## 2023-12-26 RX ADMIN — DULOXETINE HYDROCHLORIDE 30 MG: 30 CAPSULE, DELAYED RELEASE ORAL at 21:48

## 2023-12-26 RX ADMIN — CALCIUM GLUCONATE 0.5 G: 98 INJECTION, SOLUTION INTRAVENOUS at 13:26

## 2023-12-26 RX ADMIN — ACETAMINOPHEN 1000 MG: 500 TABLET ORAL at 17:34

## 2023-12-26 RX ADMIN — KETOROLAC TROMETHAMINE 15 MG: 30 INJECTION, SOLUTION INTRAMUSCULAR; INTRAVENOUS at 23:43

## 2023-12-26 RX ADMIN — LIDOCAINE HYDROCHLORIDE 40 MG: 20 INJECTION, SOLUTION EPIDURAL; INFILTRATION; INTRACAUDAL at 13:05

## 2023-12-26 ASSESSMENT — PAIN DESCRIPTION - PAIN TYPE
TYPE: SURGICAL PAIN
TYPE: ACUTE PAIN

## 2023-12-26 ASSESSMENT — FIBROSIS 4 INDEX: FIB4 SCORE: 1.19

## 2023-12-26 ASSESSMENT — PAIN SCALES - GENERAL: PAIN_LEVEL: 3

## 2023-12-26 NOTE — ANESTHESIA PROCEDURE NOTES
Airway    Date/Time: 12/26/2023 1:05 PM    Performed by: Hal Hedrick M.D.  Authorized by: Hal Hedrick M.D.    Location:  OR  Urgency:  Elective  Indications for Airway Management:  Anesthesia      Spontaneous Ventilation: absent    Sedation Level:  Deep  Preoxygenated: Yes    Final Airway Type:  Supraglottic airway  Final Supraglottic Airway:  Standard LMA    SGA Size:  3  Number of Attempts at Approach:  1

## 2023-12-26 NOTE — OR NURSING
1445- Break RN: Report received from Andrew CARDENAS. Pt pain 3/10 and tolerable at this time. Pt given water. Dressing is c/d/i with juan device flashing green.     1450- Pt dozing. When the patient sleeps her oxygen decreases to 87%. Placed the patient on oxygen via nasal cannula at 2 L.     1500- Pt sleeping at this time. Not roused.     1515- Patient roused. Patient met criteria for transfer to room.  Patient states good pain control. Pain is 3/10.  Denies N/V, tolerating PO well. Surgical dressing CDI. Ice in place to the right hip. Report called to Verito CARDENAS.  Awaiting radiology prior to transport to the floor.   Report to Andrew CARDENAS.

## 2023-12-26 NOTE — ANESTHESIA TIME REPORT
Anesthesia Start and Stop Event Times       Date Time Event    12/26/2023 1130 Ready for Procedure     1259 Anesthesia Start     1420 Anesthesia Stop          Responsible Staff  12/26/23      Name Role Begin End    Hal Hedrick M.D. Anesth 1259 1420          Overtime Reason:  no overtime (within assigned shift)    Comments:

## 2023-12-26 NOTE — ANESTHESIA POSTPROCEDURE EVALUATION
Patient: Letitia Ghosh    Procedure Summary       Date: 12/26/23 Room / Location:  OR 02 / SURGERY [facility]    Anesthesia Start: 1259 Anesthesia Stop: 1420    Procedure: ARTHROPLASTY, HIP, TOTAL, ANTERIOR APPROACH (Right: Hip) Diagnosis:       Primary osteoarthritis of right hip      (Primary osteoarthritis of right hip [M16.11])    Surgeons: Pino Dodge M.D. Responsible Provider: Hal Hedrick M.D.    Anesthesia Type: general ASA Status: 2            Final Anesthesia Type: general  Last vitals  BP   Blood Pressure : (!) 167/63    Temp   36.3 °C (97.3 °F)    Pulse   79   Resp   16    SpO2   100 %      Anesthesia Post Evaluation    Patient location during evaluation: PACU  Patient participation: complete - patient participated  Level of consciousness: awake and alert  Pain score: 3    Airway patency: patent  Anesthetic complications: no  Cardiovascular status: hemodynamically stable  Respiratory status: acceptable  Hydration status: euvolemic    PONV: none          No notable events documented.     Nurse Pain Score: 5 (NPRS)

## 2023-12-26 NOTE — ANESTHESIA PREPROCEDURE EVALUATION
Case: [id number] Date/Time: 12/26/23 1345    Procedure: ARTHROPLASTY, HIP, TOTAL, ANTERIOR APPROACH (Right)    Diagnosis: Primary osteoarthritis of right hip [M16.11]    Pre-op diagnosis: Primary osteoarthritis of right hip [M16.11]    Location:  OR 02 / SURGERY [facility]    Surgeons: Pino Dodge M.D.            Relevant Problems   CARDIAC   (positive) Hot flashes      GI   (positive) Gastroesophageal reflux disease with hiatal hernia      ENDO   (positive) Hypothyroidism   (positive) Hypothyroidism due to acquired atrophy of thyroid       Physical Exam    Airway   Mallampati: II  TM distance: >3 FB  Neck ROM: full       Cardiovascular - normal exam  Rhythm: regular  Rate: normal  (-) murmur     Dental - normal exam           Pulmonary - normal exam  Breath sounds clear to auscultation     Abdominal    Neurological - normal exam                   Anesthesia Plan    ASA 2       Plan - general       Airway plan will be LMA          Induction: intravenous    Postoperative Plan: Postoperative administration of opioids is intended.    Pertinent diagnostic labs and testing reviewed    Informed Consent:    Anesthetic plan and risks discussed with patient.    Use of blood products discussed with: patient whom consented to blood products.

## 2023-12-26 NOTE — OR NURSING
1402: Patient arrived to PACU from OR via rney. Report received from anesthesia and RN. Respirations are spontaneous and unlabored. VSS on 6L. Dressing is CDI. Cold pack applied. RLE elevated. RLE: pedal pulse 2+, cap refill less than 3 seconds, warm, pink. OPA in place.    1411: OPA removed    1420: c/o 6/10 right hip pain. See MAR, PO analgesia given    1430: c/o 6/10 right hip pain. See MAR     1440: c/o 6/10 right hip pain. See MAR     1445: report to Natali CARDENAS    1515: Report received. Pt sleeping in bed. Dressing CDI. VSS on 2L. Family updated    1518: Xray at bedside    1529: transport at bedside

## 2023-12-26 NOTE — H&P
Surgery Orthopedic History & Physical Note    Date  12/26/2023    Primary Care Physician  Les Mendosa D.O.      Pre-Op Diagnosis Codes:     * Primary osteoarthritis of right hip [M16.11]    HPI  This is a 78 y.o. female who presents for a MALLY.    Past Medical History:   Diagnosis Date    Arthritis     Blood clotting disorder (HCC) 2006    right PE    Bowel habit changes     Cancer (HCC) 1968    cervical-partial hysterectomy. No chemo or radiation    Cataract     bilat IOL    Dyslipidemia, goal LDL below 130 12/07/2016    Fecal incontinence 2016    [place]--anal canal bulking injection; Dr. Gatica    Heart burn     12/12/23-resolved.    Hiatus hernia syndrome     Hypothyroid     Indigestion     12/12/23-RESOLVED    Osteopenia     Osteoporosis     Pain     chronic back pain    Reflux     Right hip pain 12/12/2023    with right knee pain    Right knee pain 08/05/2019    Urinary bladder disorder     interstitial cystitis.     Urinary incontinence        Past Surgical History:   Procedure Laterality Date    INCISION AND DRAINAGE ORTHOPEDIC Left 03/19/2023    Procedure: LEFT HAND INCISION AND DRAINAGE,  WOUND CLOSURE, AND LEFT HAND METACARPAL PINNING;  Surgeon: Tyler Dumont M.D.;  Location: SURGERY [facility];  Service: Orthopedics    [place] NEUROPLASTY & OR TRANSPOS ULNAR NERVE ELBOW Left 11/04/2022    Procedure: LEFT HAND OPEN CUBITAL TUNNEL RELEASE, POSSIBLE LEFT ULNAR NERVE TRANSPOSITION;  Surgeon: Yumi Clifton M.D.;  Location: [facility];  Service: Orthopedics    [place] NEUROPLASTY & OR TRANSPOS MEDIAN NRV CARPAL DANIELE Left 11/04/2022    Procedure: LEFT OPEN CARPAL TUNNEL RELEASE;  Surgeon: Yumi Clifton M.D.;  Location: [facility];  Service: Orthopedics    PB REVISE KNEE JOINT REPLACE,ALL PARTS Right 08/19/2019    Procedure: REVISION, TOTAL ARTHROPLASTY, KNEE, ALL COMPONENTS;  Surgeon: Philip Garcia M.D.;  Location: SURGERY [facility];   Service: Orthopedics    FUSION, SPINE, LUMBAR, PLIF  10/03/2016    Procedure: LUMBAR FUSION POSTERIOR L2-3 laminectomy;  Surgeon: Philip Ham M.D.;  Location: SURGERY [facility];  Service:     HARDWARE REMOVAL NEURO  10/03/2016    Procedure: HARDWARE REMOVAL NEURO L3-S1;  Surgeon: Philip Ham M.D.;  Location: SURGERY [facility];  Service:     LUMBAR EXPLORATION  10/03/2016    Procedure: LUMBAR EXPLORATION POSTERIOR L2-S1 EXPLORATION OF FUSION;  Surgeon: Philip Ham M.D.;  Location: SURGERY [facility];  Service:     ENDOSCOPY PROCEDURE  08/15/2016    Procedure: ENDOSCOPY PROCEDURE;  Surgeon: Tommy Das M.D.;  Location: SURGERY [facility];  Service:     BOWEL STIMULATOR INSERTION  07/27/2016    Procedure: BOWEL STIMULATOR INSERTION FOR PERMANENT INTERSTIM SACRAL IMPLANT;  Surgeon: Margarito Gatica M.D.;  Location: SURGERY [facility];  Service:     ARTHROPLASTY Right 09/15/2015    R TKR Dr Izaguirre    OTHER ORTHOPEDIC SURGERY  2015    right knee    COLONOSCOPY  2010    CATARACT PHACO WITH IOL Bilateral 2009    SALPINGECTOMY Bilateral 1978    with oophorectomy    BLADDER SUSPENSION  1975    LUMPECTOMY Bilateral 1975    breasts for benign tumors    ABDOMINAL SUBTOTAL HYSTERECTOMY  1968    LAMINOTOMY  2006 & 2009    L3-S1 '09  (L4-5 was done in '06)    NO PERTINENT PAST SURGICAL HISTORY  1968,1975,1978    All Female related    OTHER NEUROLOGICAL SURG  2006,09,19,,2020       Current Facility-Administered Medications   Medication Dose Route Frequency Provider Last Rate Last Admin    ceFAZolin (Ancef) injection 2 g  2 g Intravenous Once Pino Dodge M.D.        lactated ringers infusion   Intravenous Continuous Pino Dodge M.D.        lactated ringers infusion   Intravenous Continuous Pino Dodge M.D.           Social History     Socioeconomic History    Marital status:      Spouse name: Not on file    Number of children: Not on file    Years of education: Not on file     Highest education level: Not on file   Occupational History    Not on file   Tobacco Use    Smoking status: Former     Current packs/day: 0.00     Average packs/day: 1 pack/day for 25.0 years (25.0 ttl pk-yrs)     Types: Cigarettes     Start date: 1962     Quit date: 1987     Years since quittin.3     Passive exposure: Never    Smokeless tobacco: Never   Vaping Use    Vaping Use: Never used   Substance and Sexual Activity    Alcohol use: No    Drug use: Never    Sexual activity: Not Currently     Comment: ; Brendan Datt is her POA   Other Topics Concern    Not on file   Social History Narrative    Not on file     Social Determinants of Health     Financial Resource Strain: Not on file   Food Insecurity: Not on file   Transportation Needs: Not on file   Physical Activity: Not on file   Stress: Not on file   Social Connections: Not on file   Intimate Partner Violence: Not on file   Housing Stability: Not on file       Family History   Problem Relation Age of Onset    Heart Disease Mother     Diabetes Father        Allergies  Azithromycin, Celecoxib, Codeine, Contrast media with iodine [iodine], Gadolinium derivatives, Latex, Percocet [oxycodone-acetaminophen], Sulfa drugs, Tape, Contrast allergy premed pack [prednisone & diphenhydramine], and Hydrocodone    Review of Systems  Negative    Physical Exam  Regular rate and rhythm  Nonlabored breathing  Abdomen soft and nontender   Neurovascular intact distally  Skin is in good condition    Vital Signs  Blood Pressure : (!) 173/76   Temperature: 36.3 °C (97.3 °F)   Pulse: 82   Respiration: 18   Pulse Oximetry: 94 %       Labs:                    Radiology:  DX-HIP-COMPLETE - UNILATERAL 2+ RIGHT    (Results Pending)   DX-PORTABLE FLUORO > 1 HOUR    (Results Pending)         Assessment/Plan:  Pre-Op Diagnosis Codes:     * Primary osteoarthritis of right hip [M16.11]  Procedure(s):  ARTHROPLASTY, HIP, TOTAL, ANTERIOR APPROACH

## 2023-12-26 NOTE — LETTER
December 5, 2023    Patient Name: Letitia Ghosh  Surgeon Name: Pino Dodge M.D.  Surgery Facility: [facility] ([address] [facility])  Surgery Date: 12/26/2023    The time of your surgery is not final and may change up to and until the day of your surgery. You will be contacted 24-48 hours prior to your surgery date with your check-in and surgery time.    If you will not be at one of the below numbers please call the surgery scheduler at [phone]  Preferred Phone: [phone]    BEFORE YOUR SURGERY   Pre Registration and/or Lab Work must be done within and no earlier than 28 days prior to your surgery date. Your scheduled facility will contact you regarding all required preregistration and/or lab work. If you have not been contacted within 7 days of your scheduled procedure please call [facility] at [phone] for an appointment as soon as possible.    DAY OF YOUR SURGERY  Nothing to eat or drink after midnight     Refrain from smoking any substance after midnight prior to surgery. Smoking may interfere with the anesthetic and frequently produces nausea during the recovery period.    Continue taking all lifesaving medications. Including the morning of your surgery with small sip of water.    Please do NOT take on the day of surgery:  Diuretics: examples- furosemide (Lasix), spironolactone, hydrochlorothiazide  ACE-inhibitors: examples- lisinopril, ramipril, enalapril  “ARBs”: examples- losartan, Olmesartan, valsartan    Please arrive at the hospital/surgery center at the check-in time provided.     An adult will need to bring you and take you home after your surgery.     AFTER YOUR SURGERY  Post op Appointment:   Date: 01/10/2024   Time: 01:15PM   With: Pino Dodge MD   Location: [address] [place] [id number]    - No dental work for 3-6 months after your surgery.  - Post Surgery - You will need someone to drive you home  - Post  Surgery - You will need someone to stay with you for 24 hours  - You must have someone provide transportation post surgery and someone to monitor you for at least 24 hours post-surgery. If you don't have either of these your appointment will be canceled.     TIME OFF WORK  FMLA or Disability forms can be faxed directly to: [phone] or you may drop them off at [address] [place] [id number]. Our office charges a $35.00 fee per form. Forms will be completed within 10 business days of drop off and payment received. For the status of your forms you may contact our disability office directly [phone].    MEDICATION INSTRUCTIONS **Please read section completely**  The following medications should be stopped a minimum of 10 days prior to surgery:  All over the counter, Supplements & Herbal medications    Anorectics: Phentermine (Adipex-P, Lomaira and Suprenza), Phentermine-topiramate (Qsymia), Bupropion-naltrexone (Contrave)    **If you are on Bupropion for anxiety/depression, please continue this medication up until the day of surgery.     Opiod Partial Agonists/Opioid Antagonists: Buprenorphine (Suboxone, Belbuca, Butrans, Probuphine Implant, Sublocade), Naltrexone (ReVia, Vivitrol), Naloxone    Amphetamines: Dextroamphetamine/Amphetamine (Adderall, Mydayis), Methylphenidate Hydrochloride (Concerta, Metadate, Methylin, Ritalin)    The following medications should be stopped 5 days prior to surgery:  Blood Thinners: Any Aspirin, Aspirin products, anti-inflammatories such as ibuprofen and any blood thinners such as Coumadin and Plavix. Please consult your prescribing physician if you are on life saving blood thinners, in regards to when to stop medications prior to surgery.     The following medications should be stopped a minimum of 3 days prior to surgery:  PDE-5 inhibitors: Sildenafil (Viagra), Tadalafil (Cialis), Vardenafil (Levitra), Avanafil (Stendra)    MAO Inhibitors: Rasagiline (Azilect),  Selegiline (Eldepryl, Emsam, Selapar), Isocarboxazid (Marplan), Phenelzine (Nardil)

## 2023-12-26 NOTE — OP REPORT
Pre-operative Dx: Severe right hip degenerative disease  Post-operative Dx: same  Procedure:  Right total hip arthroplasty  Surgeon:  Dr. Pino Dodge MD  Assistants: Sharad Colvin PA-C  Anesthesia:  Dr. Hedrick.  General anesthetic  EBL:  150 mL  Drains:  None  Complications:  None    Findings: Severe degenerative change.  Excellent press-fit of the acetabular and femoral components.  Leg lengths were equal when checked with intraoperative fluoroscopy as well as palpation at the knees.  Soft tissue tension and stability were appropriate.      Implants:  [employer] R3 size 52 cup with an OR30 oxinium liner.  The stem was a size 4 standard offset Polar cementless stem with a 28 + 0 oxinium head.    Indications: Letitia Ghosh is a 78 y.o. female who has had severe progressive groin pain that failed conservative management. There were severe degenerative changes on radiographs and physical examination was consistent with intraarticular pathology.  She was ultimately indicated for a hip replacement.  We discussed the risk of bleeding, transfusion, pain, neurovascular injury, leg length discrepancy, dislocation, fracture, infection, wound complication, and medical complication.  No guarantees were made. She was in agreement and elected to proceed.  We did discuss the potential role of a prophylactic fixation because of her long-stemmed revision TKA and the fact that it may create a stress-riser in the mid-femur; we may do that as a staged procedure once she is healing well from the hip.    Description of Procedure:    She was identified in the preoperative holding area and informed consent and site marking were confirmed. She was then brought to the operating room. Anesthesia was administered. She was positioned supine on the operative table with appropriate padding of the extremities. Sterile prepping and draping of the surgical field was completed. I performed a pre timeout to confirm we had the correct  patient, side, site, presence of necessary personal, and equipment.  I confirmed that pre-operative antibiotics were administered including Ancef as well as tranexamic acid.    A direct anterior approach to the hip was completed. Anterior capsulotomy was completed for exposure of the hip joint. An in-situ femoral neck osteotomy was completed and the femoral head was  extracted from the acetabulum. Circumferential exposure of the acetabulum was completed. Labrectomy was completed. Osteophytes were removed from the acetabular rim. Acetabular reaming was completed under direct visualization. A hemispherical press fit component was inserted using fluoroscopic navigation for alignment and depth.  An acetabular liner was locked in place and checked to ensure proper locking.    Attention was then focused to the femur. The operative leg was placed in extension, external rotation, and adduction. A posterior capsulotomy was completed to assist with femoral mobilization. The femur was then sequentially broached under direct visualization to the desired broach size. The hip was reduced and evaluated using fluoroscopy for component alignment, component sizing, leg length, offset, external rotation to 90 degrees to make sure there was no posterior impingement or anterior subluxation. The hip was dislocated with traction and external rotation. Femoral exposure was repeated. The broach was removed and the final femoral stem was implanted. The final femoral head was placed on a clean and dry trunnion. The hip was reduced. Final fluoroscopic images were completed.    Irrigation of the operative field was followed by layered closure. One gram of Vancomycin powder was left deep in the wound.  The capsule and fascia were closed with a  Quill suture.  Skin closure was completed with subcuticular monocryl, running monocryl, and Dermabond with an overlying occlusive silver dressing. The patient tolerated the procedure well and was taken to  the recovery room in stable condition. All counts were correct.      Postoperative plan:  WBAT with a walker (which will need to be provided if they do not already have one due to weakness, imbalance, and fall risk), DVT ppx for 4 weeks including aspirin 81mg BID, PT/OT eval.  Followup in 2 weeks in clinic.

## 2023-12-27 VITALS
WEIGHT: 121.8 LBS | HEIGHT: 60 IN | OXYGEN SATURATION: 93 % | HEART RATE: 92 BPM | BODY MASS INDEX: 23.91 KG/M2 | SYSTOLIC BLOOD PRESSURE: 124 MMHG | DIASTOLIC BLOOD PRESSURE: 62 MMHG | RESPIRATION RATE: 16 BRPM | TEMPERATURE: 98 F

## 2023-12-27 PROCEDURE — A9270 NON-COVERED ITEM OR SERVICE: HCPCS | Mod: JZ | Performed by: STUDENT IN AN ORGANIZED HEALTH CARE EDUCATION/TRAINING PROGRAM

## 2023-12-27 PROCEDURE — 700102 HCHG RX REV CODE 250 W/ 637 OVERRIDE(OP): Performed by: STUDENT IN AN ORGANIZED HEALTH CARE EDUCATION/TRAINING PROGRAM

## 2023-12-27 PROCEDURE — 99024 POSTOP FOLLOW-UP VISIT: CPT | Performed by: ORTHOPAEDIC SURGERY

## 2023-12-27 PROCEDURE — 97165 OT EVAL LOW COMPLEX 30 MIN: CPT

## 2023-12-27 PROCEDURE — 97162 PT EVAL MOD COMPLEX 30 MIN: CPT

## 2023-12-27 PROCEDURE — 770030 HCHG ROOM/CARE - EXTENDED RECOVERY EACH 15 MIN

## 2023-12-27 PROCEDURE — 51798 US URINE CAPACITY MEASURE: CPT

## 2023-12-27 PROCEDURE — 700102 HCHG RX REV CODE 250 W/ 637 OVERRIDE(OP): Performed by: ORTHOPAEDIC SURGERY

## 2023-12-27 PROCEDURE — 700111 HCHG RX REV CODE 636 W/ 250 OVERRIDE (IP): Performed by: STUDENT IN AN ORGANIZED HEALTH CARE EDUCATION/TRAINING PROGRAM

## 2023-12-27 PROCEDURE — 94760 N-INVAS EAR/PLS OXIMETRY 1: CPT

## 2023-12-27 PROCEDURE — A9270 NON-COVERED ITEM OR SERVICE: HCPCS | Performed by: ORTHOPAEDIC SURGERY

## 2023-12-27 PROCEDURE — 700105 HCHG RX REV CODE 258: Performed by: ORTHOPAEDIC SURGERY

## 2023-12-27 PROCEDURE — 97535 SELF CARE MNGMENT TRAINING: CPT

## 2023-12-27 RX ORDER — SODIUM CHLORIDE 9 MG/ML
1000 INJECTION, SOLUTION INTRAVENOUS ONCE
Status: COMPLETED | OUTPATIENT
Start: 2023-12-27 | End: 2023-12-27

## 2023-12-27 RX ADMIN — TRIAMTERENE AND HYDROCHLOROTHIAZIDE 1 TABLET: 37.5; 25 TABLET ORAL at 06:15

## 2023-12-27 RX ADMIN — DEXAMETHASONE SODIUM PHOSPHATE 10 MG: 4 INJECTION, SOLUTION INTRAMUSCULAR; INTRAVENOUS at 06:16

## 2023-12-27 RX ADMIN — POTASSIUM CHLORIDE 10 MEQ: 1500 TABLET, EXTENDED RELEASE ORAL at 06:14

## 2023-12-27 RX ADMIN — KETOROLAC TROMETHAMINE 15 MG: 30 INJECTION, SOLUTION INTRAMUSCULAR; INTRAVENOUS at 06:15

## 2023-12-27 RX ADMIN — LEVOTHYROXINE SODIUM 125 MCG: 0.12 TABLET ORAL at 06:14

## 2023-12-27 RX ADMIN — SODIUM CHLORIDE 1000 ML: 9 INJECTION, SOLUTION INTRAVENOUS at 06:33

## 2023-12-27 RX ADMIN — LIOTHYRONINE SODIUM 25 MCG: 25 TABLET ORAL at 06:13

## 2023-12-27 RX ADMIN — ACETAMINOPHEN 1000 MG: 500 TABLET ORAL at 06:15

## 2023-12-27 RX ADMIN — VIBEGRON 75 MG: 75 TABLET, FILM COATED ORAL at 06:15

## 2023-12-27 RX ADMIN — LIOTHYRONINE SODIUM 15 MCG: 5 TABLET ORAL at 06:14

## 2023-12-27 RX ADMIN — ACETAMINOPHEN 1000 MG: 500 TABLET ORAL at 12:42

## 2023-12-27 RX ADMIN — ASPIRIN 81 MG: 81 TABLET, COATED ORAL at 02:46

## 2023-12-27 ASSESSMENT — COGNITIVE AND FUNCTIONAL STATUS - GENERAL
MOVING TO AND FROM BED TO CHAIR: A LITTLE
CLIMB 3 TO 5 STEPS WITH RAILING: A LOT
DRESSING REGULAR LOWER BODY CLOTHING: A LITTLE
WALKING IN HOSPITAL ROOM: A LOT
TOILETING: A LITTLE
SUGGESTED CMS G CODE MODIFIER DAILY ACTIVITY: CJ
DAILY ACTIVITIY SCORE: 21
TURNING FROM BACK TO SIDE WHILE IN FLAT BAD: A LITTLE
SUGGESTED CMS G CODE MODIFIER MOBILITY: CH
HELP NEEDED FOR BATHING: A LITTLE
SUGGESTED CMS G CODE MODIFIER DAILY ACTIVITY: CJ
SUGGESTED CMS G CODE MODIFIER MOBILITY: CK
STANDING UP FROM CHAIR USING ARMS: A LOT
DRESSING REGULAR LOWER BODY CLOTHING: A LITTLE
HELP NEEDED FOR BATHING: A LITTLE
MOBILITY SCORE: 24
MOVING FROM LYING ON BACK TO SITTING ON SIDE OF FLAT BED: A LITTLE
TOILETING: A LITTLE
MOBILITY SCORE: 15
DAILY ACTIVITIY SCORE: 21

## 2023-12-27 ASSESSMENT — GAIT ASSESSMENTS
DEVIATION: STEP TO
DISTANCE (FEET): 25
DISTANCE (FEET): 150
GAIT LEVEL OF ASSIST: STANDBY ASSIST
ASSISTIVE DEVICE: FRONT WHEEL WALKER

## 2023-12-27 ASSESSMENT — LIFESTYLE VARIABLES
TOTAL SCORE: 0
EVER FELT BAD OR GUILTY ABOUT YOUR DRINKING: NO
CONSUMPTION TOTAL: NEGATIVE
HAVE YOU EVER FELT YOU SHOULD CUT DOWN ON YOUR DRINKING: NO
ALCOHOL_USE: NO
HAVE PEOPLE ANNOYED YOU BY CRITICIZING YOUR DRINKING: NO
TOTAL SCORE: 0
HOW MANY TIMES IN THE PAST YEAR HAVE YOU HAD 5 OR MORE DRINKS IN A DAY: 0
AVERAGE NUMBER OF DAYS PER WEEK YOU HAVE A DRINK CONTAINING ALCOHOL: 0
ON A TYPICAL DAY WHEN YOU DRINK ALCOHOL HOW MANY DRINKS DO YOU HAVE: 0
TOTAL SCORE: 0
EVER HAD A DRINK FIRST THING IN THE MORNING TO STEADY YOUR NERVES TO GET RID OF A HANGOVER: NO

## 2023-12-27 ASSESSMENT — PAIN DESCRIPTION - PAIN TYPE: TYPE: SURGICAL PAIN

## 2023-12-27 ASSESSMENT — ACTIVITIES OF DAILY LIVING (ADL): TOILETING: INDEPENDENT

## 2023-12-27 NOTE — DISCHARGE PLANNING
Case Management Discharge Planning    Admission Date: 12/26/2023  GMLOS:    ALOS: 0    6-Clicks ADL Score: 21  6-Clicks Mobility Score: 24      Anticipated Discharge Dispo:      DME Needed: Yes    DME Ordered: Yes    Action(s) Taken: Choice obtained and Referral(s) sent    LSW completed chart review and noted pt had order for FWW. Choice form completed for [facility]. LSW sent referral.     Escalations Completed: DME Company    Medically Clear: Yes    Next Steps: LSW to follow    Barriers to Discharge: DME    Is the patient up for discharge tomorrow: No

## 2023-12-27 NOTE — DISCHARGE SUMMARY
Patient was admitted for a total hip arthroplasty.  There were no complications during the surgery. She had some initial orthostasis which limited her early mobility, but she otherwise did well post-operatively.               Active Hospital Problems    Diagnosis     Osteoarthritis of hip [M16.9]     Primary osteoarthritis of right hip [M16.11]        Uneventful hospital course.     Medication List        START taking these medications        Instructions   docusate sodium 100 MG Caps  Commonly known as: Colace   Take 1 Capsule by mouth 2 times a day for 30 days.  Dose: 100 mg     rivaroxaban 10 MG Tabs tablet  Commonly known as: Xarelto   Take 1 Tablet by mouth every day at 6 PM for 21 days.  Dose: 10 mg     traMADol 50 MG Tabs  Commonly known as: Ultram   Take 1-2 Tablets by mouth every 6 hours as needed for Moderate Pain for up to 7 days. Max 5 tablet per day  Dose:  mg            CHANGE how you take these medications        Instructions   * acetaminophen 325 MG Tabs  What changed: Another medication with the same name was added. Make sure you understand how and when to take each.  Commonly known as: Tylenol   Take 2 Tablets by mouth every 6 hours as needed for Fever, Moderate Pain or Mild Pain.  Dose: 650 mg     * acetaminophen 500 MG Tabs  What changed: You were already taking a medication with the same name, and this prescription was added. Make sure you understand how and when to take each.  Commonly known as: Tylenol   Take 2 Tablets by mouth 3 times a day as needed for Mild Pain or Moderate Pain for up to 30 days.  Dose: 1,000 mg     DULoxetine 30 MG Cpep  What changed: when to take this  Commonly known as: Cymbalta   Doctor's comments: Patient needs appointment for any further refills  Take 1 Capsule by mouth every day.  Dose: 30 mg     * meloxicam 7.5 MG Tabs  What changed: Another medication with the same name was added. Make sure you understand how and when to take each.  Commonly known as:  Mobic   Take 7.5 mg by mouth every day.  Dose: 7.5 mg     * meloxicam 7.5 MG Tabs  What changed: You were already taking a medication with the same name, and this prescription was added. Make sure you understand how and when to take each.  Commonly known as: Mobic   Take 1 Tablet by mouth every day for 30 days.  Dose: 7.5 mg           * This list has 4 medication(s) that are the same as other medications prescribed for you. Read the directions carefully, and ask your doctor or other care provider to review them with you.                CONTINUE taking these medications        Instructions   acetaZOLAMIDE 125 MG Tabs  Commonly known as: Diamox   Take 1 Tablet by mouth every day.  Dose: 1 Tablet     cephALEXin 250 MG Caps  Commonly known as: Keflex   Take 1 Capsule by mouth every day for 360 days.  Dose: 250 mg     estradiol 0.1 MG/GM vaginal cream  Commonly known as: Estrace   Apply 1g to anterior vaginal wall topically 2-3 times weekly     Gemtesa 75 MG tablet  Generic drug: vibegron   Take 75 mg by mouth every day.  Dose: 75 mg     levothyroxine 125 MCG Tabs  Commonly known as: Synthroid   Take 125 mcg by mouth every morning on an empty stomach.  Dose: 125 mcg     * liothyronine 5 MCG Tabs  Commonly known as: Cytomel   Take 15 mcg by mouth every day. Take with liothyronine 25 mcg = 40 mcg    Liothyronine 5 mcg ( x 3 tabs) = 15 mcg  Dose: 15 mcg     * liothyronine 25 MCG Tabs  Commonly known as: Cytomel   Take 25 mcg by mouth every day. Take with liothyronine 5 mcg ( x 3 tabs) = total dose 40 mcg  Dose: 25 mcg     potassium chloride SA 10 MEQ Tbcr  Commonly known as: K-Dur   Take 10 mEq by mouth every day.  Dose: 10 mEq     triamterene/hctz 37.5-25 MG Caps  Commonly known as: Maxzide-25/Dyazide   Take 1 Capsule by mouth every day. Endocrinology  Dose: 1 Capsule           * This list has 2 medication(s) that are the same as other medications prescribed for you. Read the directions carefully, and ask your doctor or  other care provider to review them with you.

## 2023-12-27 NOTE — PROGRESS NOTES
Received report from day shift nurse. Assumed pt care. Pt is A&Ox4, resting comfortably in bed. No signs of SOB/respiratory distress. Went over plan of night with patient. Fall precautions in place. Bed at lowest position. Call light and personal belongings within reach. Patient pain 2/10, denies need for pain meds at this time. No further needs at this time.     22:09 Attempted to ambulate patient, patient felt lightheaded. Pt helped back to bed, vitals taken. No chest pain reported. Unable to safely ambulate pt at this time.     02:55 Pt voided using bedside commode. RN attempted to ambulate pt, pt unable to ambulate safely due to lightheadedness. Pt helped back to bed, vitals taken.

## 2023-12-27 NOTE — CARE PLAN
Problem: Post-Operative Hip Replacement  Goal: Patient's neurovascular status will be maintained or improve  Outcome: Progressing     Problem: Post-Operative Hip Replacement  Goal: Early mobilization post surgery  Outcome: Not Met     Problem: Post-Operative Hip Replacement  Goal: Patient will demonstrate understanding of post-surgical hip precautions, weight bearing restrictions and DME usage during hospital stay and post discharge  Outcome: Progressing   The patient is {Patient Stability:[id number]}    Shift Goals  Clinical Goals: Pt will remain free from falls throughout shift. Patient will void in bedside commode during shift.  Patient Goals: Sleep comfortably    Progress made toward(s) clinical / shift goals:  ***    Patient is not progressing towards the following goals:      Problem: Post-Operative Hip Replacement  Goal: Early mobilization post surgery  Outcome: Not Met

## 2023-12-27 NOTE — PROGRESS NOTES
Subjective:    Lightheaded overnight, which has limited her mobility.  Her hip and knee pain are significantly improved.  Pain reasonably well controlled.    Objective:  BP (!) 145/62   Pulse 90   Temp 36.2 °C (97.2 °F) (Temporal)   Resp 17   Ht 1.524 m (5')   Wt 55.3 kg (121 lb 12.9 oz)   SpO2 92%                 Intake/Output Summary (Last 24 hours) at 12/27/2023 0628  Last data filed at 12/27/2023 0300  Gross per 24 hour   Intake 1040 ml   Output 500 ml   Net 540 ml       Comfortable, no distress  Neurologically and vascularly intact with palpable pedal pulses bilaterally.  Dressing C/D/I    Assessment:    Doing well s/p total hip arthroplasty.  Postoperative light headedness/orthostasis    Plan:      Bolus this morning, and we'll see how she progresses throughout the day.    Diet as tolerated  WBAT  OT/PT  Pain control/Ice   DVT ppx - ASA BID + Sequential Compression Devices  Plan to discharge home if mobilizing safely and orthostasis improves  Follow-up approximately 2 weeks post-op. [phone]

## 2023-12-27 NOTE — PROGRESS NOTES
4 Eyes Skin Assessment Completed by Verito RN and Lloyd, RN.    Head WDL  Ears WDL  Nose WDL  Mouth WDL  Neck WDL  Breast/Chest WDL  Shoulder Blades WDL  Spine WDL scar  (R) Arm/Elbow/Hand Bruising  (L) Arm/Elbow/Hand Bruising  Abdomen WDL  Groin WDL  Scrotum/Coccyx/Buttocks WDL  (R) Leg Incision  (L) Leg WDL  (R) Heel/Foot/Toe WDL  (L) Heel/Foot/Toe WDL          Devices In Places Blood Pressure Cuff, Pulse Ox, SCD's, and Nasal Cannula      Interventions In Place NC W/Ear Foams and Pillows    Possible Skin Injury No    Pictures Uploaded Into Epic N/A  Wound Consult Placed N/A  RN Wound Prevention Protocol Ordered No

## 2023-12-27 NOTE — CARE PLAN
The patient is {Patient Stability:[id number]}    Shift Goals  Clinical Goals: Pt will remain free from falls hroughout shift. Patient will void in bedside commode during shift.  Patient Goals: Sleep comfortably    Progress made toward(s) clinical / shift goals:  ***    Patient is not progressing towards the following goals:      Problem: Extended Recovery Optimal Care  Goal: Patient will achieve/maintain normal respiratory rate/effort  Outcome: Progressing     Problem: Pain - Standard  Goal: Alleviation of pain or a reduction in pain to the patient’s comfort goal  Outcome: Progressing

## 2023-12-27 NOTE — THERAPY
Occupational Therapy   Initial Evaluation     Patient Name: Letitia Ghosh  Age:  78 y.o., Sex:  female  Medical Record #: [id number]  Today's Date: 12/27/2023     Precautions  Precautions: Anterior Hip Precautions, Weight Bearing As Tolerated Right Lower Extremity    Assessment  Patient is 78 y.o. female with a diagnosis of Right MALLY (Anterior).  Additional factors influencing patient status / progress: Anterior hip precautions, WBAT RLE.  Pt resides in a [facility] in [place], [place].  Freinds are available to assist as needed.  Two friends will alternate staying w/ pt to assist.  PLOF Mod I to Indep for ADL's, transfers and ambulation w/out a device.  Therapist reviewed environmental/home safety, fall precautions, Anterior hip precautions, AE/DME, ADL's and transfers.    Plan    Occupational Therapy Initial Treatment Plan   Duration: (P) Evaluation only    DC Equipment Recommendations: (P) Tub / Shower Seat  Discharge Recommendations: (P) Anticipate that the patient will have no further occupational therapy needs after discharge from the hospital.     Subjective    Pt was alert and cooperative w/ tx.     Objective       12/27/23 1020    Services   Is patient using  services for this encounter? No   Initial Contact Note    Initial Contact Note Order Received and Verified, Evaluation Only - Patient Does Not Require Further Acute Occupational Therapy at this Time.  However, May Benefit from Post Acute Therapy for Higher Level Functional Deficits.   Prior Living Situation   Prior Services None   Housing / Facility 1 Story House   Steps Into Home 1   Steps In Home 0   Bathroom Set up Walk In Shower;Grab Bars   Equipment Owned Front-Wheel Walker;Tub / Shower Seat;Grab Bar(s) In Tub / Shower;Grab Bar(s) By Toilet   Lives with - Patient's Self Care Capacity Alone and Able to Care For Self   Comments Pt resides in a [facility] in [place], [place].  Freinds are available to assist as needed.  Two friends will  alternate staying w/ pt to assist.  PLOF Mod I to Indep for ADL's, transfers and ambulation w/out a device.   Prior Level of ADL Function   Self Feeding Independent   Grooming / Hygiene Independent   Bathing Independent   Dressing Independent   Toileting Independent   Prior Level of IADL Function   Medication Management Independent   Laundry Independent   Kitchen Mobility Independent   Finances Independent   Home Management Independent   Shopping Independent   Prior Level Of Mobility Independent Without Device in Community;Independent With Steps in Community;Independent Without Device in Home;Independent With Steps in Home   Driving / Transportation Driving Independent   Occupation (Pre-Hospital Vocational) Retired Due To Age   Precautions   Precautions Anterior Hip Precautions;Weight Bearing As Tolerated Right Lower Extremity   Vitals   Pulse 92   Patient BP Position Sitting   Blood Pressure  124/62   Respiration 16   Pulse Oximetry 93 %   O2 (LPM) 0   O2 Delivery Device Room air w/o2 available   Vitals Comments Maintained stable O2 sat rate while on room air throughout tx session.   Pain   Intervention Cold Pack;Rest;Repositioned   Pain 0 - 10 Group   Location Hip   Location Orientation Right   Description Aching   Comfort Goal Comfort with Movement;Perform Activity   Therapist Pain Assessment Prior to Activity;During Activity;Post Activity;1   Non Verbal Descriptors   Non Verbal Scale  Calm;Unlabored Breathing   Cognition    Cognition / Consciousness WDL   Level of Consciousness Alert   Passive ROM Upper Body   Passive ROM Upper Body WDL   Active ROM Upper Body   Active ROM Upper Body  WDL   Dominant Hand Right   Strength Upper Body   Upper Body Strength  WDL   Upper Body Muscle Tone   Upper Body Muscle Tone  WDL   Coordination Upper Body   Coordination WDL   Balance Assessment   Sitting Balance (Static) Good   Sitting Balance (Dynamic) Good   Standing Balance (Static) Good   Standing Balance (Dynamic) Fair +    Weight Shift Sitting Good   Weight Shift Standing Good   Comments OOB FWW   Bed Mobility    Supine to Sit Supervised   Sit to Supine Supervised   ADL Assessment   Grooming Modified Independent;Standing   Upper Body Dressing Independent   Lower Body Dressing Minimal Assist   Toileting Supervision   How much help from another person does the patient currently need...   Putting on and taking off regular lower body clothing? 3   Bathing (including washing, rinsing, and drying)? 3   Toileting, which includes using a toilet, bedpan, or urinal? 3   Putting on and taking off regular upper body clothing? 4   Taking care of personal grooming such as brushing teeth? 4   Eating meals? 4   6 Clicks Daily Activity Score 21   Functional Mobility   Sit to Stand Supervised   Bed, Chair, Wheelchair Transfer Standby Assist   Toilet Transfers Standby Assist   Transfer Method Stand Step   Mobility Sup FWW   Distance (Feet) 25   # of Times Distance was Traveled 2   Edema / Skin Assessment   Edema / Skin  WDL   Comments Surgical site/dressing clean, dry and intact.  Therapist reviewed wear/care Buzz dressing.   Education Group   Education Provided Joint Protection;Home Safety;Hip Precautions;Transfers;Role of Occupational Therapist;Activities of Daily Living;Adaptive Equipment   Role of Occupational Therapist Patient Response Patient;Acceptance;Explanation;Verbal Demonstration   Hip Precautions Patient Response Patient;Acceptance;Explanation;Demonstration;Handout;Teach Back;Verbal Demonstration;Action Demonstration   Joint Protection Patient Response Patient;Acceptance;Explanation;Demonstration;Handout;Teach Back;Verbal Demonstration;Action Demonstration   Home Safety Patient Response Patient;Acceptance;Explanation;Demonstration;Teach Back;Verbal Demonstration;Action Demonstration   Transfers Patient Response Patient;Acceptance;Explanation;Demonstration;Teach Back;Verbal Demonstration;Action Demonstration   ADL Patient Response  Patient;Acceptance;Explanation;Demonstration;Teach Back;Verbal Demonstration;Action Demonstration   Adaptive Equipment Patient Response Patient;Acceptance;Explanation;Demonstration;Teach Back;Verbal Demonstration;Action Demonstration   Occupational Therapy Initial Treatment Plan    Duration Evaluation only   Anticipated Discharge Equipment and Recommendations   DC Equipment Recommendations Tub / Shower Seat   Discharge Recommendations Anticipate that the patient will have no further occupational therapy needs after discharge from the hospital

## 2023-12-27 NOTE — THERAPY
Physical Therapy   Initial Evaluation     Patient Name: Letitia Ghosh  Age:  78 y.o., Sex:  female  Medical Record #: [id number]  Today's Date: 12/27/2023     Precautions  Precautions: (P) Anterior Hip Precautions;Weight Bearing As Tolerated Right Lower Extremity    Assessment  Patient is 78 y.o. female with a diagnosis of R THR ant Pt lives at home alone and is active.Pt is safe with bed mob,transfers,ambulation and stairs.She understands HEP and precautions and has no equipment needs.      Plan    DC Equipment Recommendations: (P) None  Discharge Recommendations: (P) Recommend outpatient physical therapy services to address higher level deficits       Objective       12/27/23 1100   Charge Group   PT Evaluation PT Evaluation Mod   PT Self Care / Home Evaluation (Units) 1   Total Time Spent   PT Evaluation Time Spent (Mins) 30   Initial Contact Note    Initial Contact Note Order Received and Verified, Physical Therapy Evaluation in Progress with Full Report to Follow.   Precautions   Precautions Anterior Hip Precautions;Weight Bearing As Tolerated Right Lower Extremity   Prior Living Situation   Prior Services None   Housing / Facility 1 Story House   Steps Into Home 1   Steps In Home 0   Equipment Owned Front-Wheel Walker   Lives with - Patient's Self Care Capacity Alone and Able to Care For Self   Comments friend will help as needed   Prior Level of Functional Mobility   Bed Mobility Independent   Transfer Status Independent   Ambulation Independent   Ambulation Distance community   Assistive Devices Used None   Passive ROM Lower Body   Passive ROM Lower Body X   Active ROM Lower Body    Active ROM Lower Body  X   Strength Lower Body   Lower Body Strength  X   Coordination Lower Body    Coordination Lower Body  WDL   Balance Assessment   Sitting Balance (Static) Good   Sitting Balance (Dynamic) Good   Standing Balance (Static) Good   Standing Balance (Dynamic) Good   Weight Shift Sitting Good   Weight Shift  Standing Good   Gait Analysis   Gait Level Of Assist Standby Assist   Assistive Device Front Wheel Walker   Distance (Feet) 150   # of Times Distance was Traveled 2   Deviation Step To   # of Stairs Climbed 1   Level of Assist with Stairs Standby Assist   Weight Bearing Status wbat R   Functional Mobility   Sit to Stand Standby Assist   Bed, Chair, Wheelchair Transfer Standby Assist   Transfer Method Stand Step   How much difficulty does the patient currently have...   Turning over in bed (including adjusting bedclothes, sheets and blankets)? 4   Sitting down on and standing up from a chair with arms (e.g., wheelchair, bedside commode, etc.) 4   Moving from lying on back to sitting on the side of the bed? 4   How much help from another person does the patient currently need...   Moving to and from a bed to a chair (including a wheelchair)? 4   Need to walk in a hospital room? 4   Climbing 3-5 steps with a railing? 4   6 clicks Mobility Score 24   Activity Tolerance   Sitting in Chair > 1hr   Standing 10   Patient / Family Goals    Patient / Family Goal #1 Home   Anticipated Discharge Equipment and Recommendations   DC Equipment Recommendations None   Discharge Recommendations Recommend outpatient physical therapy services to address higher level deficits   Interdisciplinary Plan of Care Collaboration   IDT Collaboration with  Nursing   Session Information   Date / Session Number  12/27   Priority 0

## 2023-12-27 NOTE — DISCHARGE PLANNING
Case Management Discharge Planning    Admission Date: 12/26/2023  GMLOS:    ALOS: 0    6-Clicks ADL Score: 21  6-Clicks Mobility Score: 24      Anticipated Discharge Dispo: Discharge Disposition: Discharged to home/self care (01)  Discharge Address: [address] [place]  Discharge Contact Phone Number: [phone]    DME Needed: No    Action(s) Taken: DC Assessment Complete (See below)    LSW completed assessment, no needs identified.     Escalations Completed: None    Medically Clear: Yes    Next Steps: No needs    Barriers to Discharge: None    Is the patient up for discharge tomorrow: No          Care Transition Team Assessment    LSW met with pt at bedside to complete discharge planning assessment. LSW confirmed pt's demographics.     Pt reported she currently lives on her own in a [address] house. Pt stated she has support from her neighbors. Pt stated she receives 1600/month in [employer], and also receives the pension from her late .   Pt reported her preferred pharmacy is [facility] in [place]. Pt stated she has a walker at home.     Information Source  Orientation Level: Oriented X4  Information Given By: Patient  Who is responsible for making decisions for patient? : Patient    Readmission Evaluation  Is this a readmission?: No    Elopement Risk  Legal Hold: No  Ambulatory or Self Mobile in Wheelchair: No-Not an Elopement Risk  Disoriented: No  Psychiatric Symptoms: None  History of Wandering: No  Elopement this Admit: No  Vocalizing Wanting to Leave: No  Displays Behaviors, Body Language Wanting to Leave: No-Not at Risk for Elopement  Elopement Risk: Not at Risk for Elopement    Interdisciplinary Discharge Planning  Lives with - Patient's Self Care Capacity: Alone and Able to Care For Self  Patient or legal guardian wants to designate a caregiver: Yes  Caregiver name: Audra  Caregiver contact info: [phone]  Housing / Facility: [address] [facility] [place] [facility]""  Prior Services: None    Discharge  Preparedness  What is your plan after discharge?: Home with help  What are your discharge supports?: Other (comment) (Neighbors)  Prior Functional Level: Ambulatory, Drives Self, Independent with Activities of Daily Living, Uses Walker  Difficulity with ADLs: Walking  Difficulty with ADLs Comment: Pt uses walker  Difficulity with IADLs: None    Functional Assesment  Prior Functional Level: Ambulatory, Drives Self, Independent with Activities of Daily Living, Uses Walker    Finances  Financial Barriers to Discharge: No  Prescription Coverage: Yes    Vision / Hearing Impairment  Right Eye Vision: Impaired, Wears Glasses  Left Eye Vision: Impaired, Wears Glasses    Advance Directive  Advance Directive?: POLST    Domestic Abuse  Have you ever been the victim of abuse or violence?: No  Physical Abuse or Sexual Abuse: No  Verbal Abuse or Emotional Abuse: No  Possible Abuse/Neglect Reported to:: Not Applicable    Psychological Assessment  History of Substance Abuse: None  History of Psychiatric Problems: No  Non-compliant with Treatment: No  Newly Diagnosed Illness: No    Discharge Risks or Barriers  Discharge risks or barriers?: No  Patient risk factors: Vulnerable adult    Anticipated Discharge Information  Discharge Disposition: Discharged to home/self care (01)  Discharge Address: [address]  Discharge Contact Phone Number: [phone]

## 2023-12-27 NOTE — PROGRESS NOTES
Pt arrived to unit in stable condition via bed. A and O  x4. No c/o pain. R hip juan dsg. CD and Intact. Polar ice applied Will continue to follow.

## 2023-12-27 NOTE — CARE PLAN
The patient is Stable - Low risk of patient condition declining or worsening         Progress made toward(s) clinical / shift goals:        Patient is not progressing towards the following goals:

## 2023-12-29 ENCOUNTER — TELEPHONE (OUTPATIENT)
Dept: HEALTH INFORMATION MANAGEMENT | Facility: OTHER | Age: 78
End: 2023-12-29
Payer: COMMERCIAL

## 2024-06-12 PROBLEM — M19.041 OSTEOARTHRITIS OF RIGHT HAND: Status: ACTIVE | Noted: 2024-06-12

## 2024-06-12 PROBLEM — M65.30 TRIGGER FINGER: Status: ACTIVE | Noted: 2024-06-12

## 2024-06-12 PROBLEM — M19.042 OSTEOARTHRITIS OF LEFT HAND: Status: ACTIVE | Noted: 2024-06-12

## 2024-10-02 PROBLEM — M65.351 TRIGGER FINGER, RIGHT LITTLE FINGER: Status: ACTIVE | Noted: 2024-10-02

## (undated) DEVICE — SENSOR OXIMETER ADULT SPO2 RD SET (20EA/BX)

## (undated) DEVICE — BLADE SAGITTAL SYSTEM 18MM

## (undated) DEVICE — TOURNIQUET CUFF 12 X 3.5 ONE PORT DISP. - STERILE (10/BX)

## (undated) DEVICE — GOWN WARMING STANDARD FLEX - (30/CA)

## (undated) DEVICE — BOVIE BLADE COATED - (50/PK)

## (undated) DEVICE — GLOVE SZ 6 BIOGEL PI MICRO - PF LF (50PR/BX 4BX/CA)

## (undated) DEVICE — PADDING CAST 3 IN STERILE - 3 X 4 YDS (24EA/CA)

## (undated) DEVICE — ELECTRODE 850 FOAM ADHESIVE - HYDROGEL RADIOTRNSPRNT (50/PK)

## (undated) DEVICE — ELECTRODE DUAL RETURN W/ CORD - (50/PK)

## (undated) DEVICE — MIXER BONE CEMENT REVOLUTION - W/FEMORAL PRESSURIZER (6/CA)

## (undated) DEVICE — SUTURE 3-0 ETHILON FS-1 - (36/BX) 30 INCH

## (undated) DEVICE — BANDAGE ELASTIC 6 HONEYCOMB - 6X5YD LF (20/CA)"

## (undated) DEVICE — SENSOR SPO2 NEO LNCS ADHESIVE (20/BX) SEE USER NOTES

## (undated) DEVICE — DRAPE SRG LG BCK TBL DISP - 10/CA

## (undated) DEVICE — BAG, SPONGE COUNT 50600

## (undated) DEVICE — CHLORAPREP 26 ML APPLICATOR - ORANGE TINT(25/CA)

## (undated) DEVICE — GLOVE SZ 7.5 BIOGEL PI MICRO - PF LF (50PR/BX)

## (undated) DEVICE — PACK TOTAL HIP - (1/CA)

## (undated) DEVICE — LENS/HOOD FOR SPACESUIT - (32/PK) PEEL AWAY FACE

## (undated) DEVICE — SODIUM CHL. IRRIGATION 0.9% 3000ML (4EA/CA 65CA/PF)

## (undated) DEVICE — HUMID-VENT HEAT AND MOISTURE EXCHANGE- (50/BX)

## (undated) DEVICE — SUTURE 1 ETHIBOND CTX C/R 8-1 - (12/BX)

## (undated) DEVICE — STOCKINETTE IMPERVIOUS 12X48 - STERILELF (10/CA)"

## (undated) DEVICE — SODIUM CHL IRRIGATION 0.9% 1000ML (12EA/CA)

## (undated) DEVICE — BLOCK

## (undated) DEVICE — SUTURE 5 ETHIBOND V-37 (12PK/BX)

## (undated) DEVICE — KIT ANESTHESIA W/CIRCUIT & 3/LT BAG W/FILTER (20EA/CA)

## (undated) DEVICE — CANISTER SUCTION RIGID RED 1500CC (40EA/CA)

## (undated) DEVICE — PIN GUARD WHITE .045 1.1MM (2EA/PK 40PK/BX) MUST ORDER 2 BOX MINIMUM"

## (undated) DEVICE — SUTURE GENERAL

## (undated) DEVICE — BANDAGE ROLL STERILE BULKEE 4.5 IN X 4 YD (100EA/CA)

## (undated) DEVICE — NEPTUNE 4 PORT MANIFOLD - (20/PK)

## (undated) DEVICE — GLOVE BIOGEL PI ORTHO SZ 8 PF LF (40PR/BX)

## (undated) DEVICE — GLOVE BIOGEL PI INDICATOR SZ 7.5 SURGICAL PF LF -(50/BX 4BX/CA)

## (undated) DEVICE — PROTECTOR ULNA NERVE - (36PR/CA)

## (undated) DEVICE — GLOVE BIOGEL PI INDICATOR SZ 8.0 SURGICAL PF LF -(50/BX 4BX/CA)

## (undated) DEVICE — SYSTEM PREVENA INCISION MNGM - (1/EA)

## (undated) DEVICE — SWAB CULTURE AMIES ESWAB (50EA/PK)

## (undated) DEVICE — WATER IRRIGATION STERILE 1000ML (12EA/CA)

## (undated) DEVICE — TOWELS CLOTH SURGICAL - (4/PK 20PK/CA)

## (undated) DEVICE — SPONGE GAUZE NON-STERILE 4X4 - (2000/CA 10PK/CA)

## (undated) DEVICE — BLADE RECIP 77.5 X 11.2 X .76MM (1/EA)

## (undated) DEVICE — HEAD HOLDER JUNIOR/ADULT

## (undated) DEVICE — TIP INTPLS HFLO ML ORFC BTRY - (12/CS)  FOR SURGILAV

## (undated) DEVICE — MASK ANESTHESIA ADULT  - (100/CA)

## (undated) DEVICE — PEN SKIN MARKER W/RULER - (50EA/BX)

## (undated) DEVICE — GLOVE, LITE (PAIR)

## (undated) DEVICE — PACK LOWER EXTREMITY - (2/CA)

## (undated) DEVICE — SUTURE 2-0 VICRYL PLUS CT-1 - 8 X 18 INCH(12/BX)

## (undated) DEVICE — GLOVE BIOGEL PI ORTHO SZ 7.5 PF LF (40PR/BX)

## (undated) DEVICE — GLOVE BIOGEL M SZ 8 SURGICAL PF LTX - (50/BX 4BX/CA)

## (undated) DEVICE — Device

## (undated) DEVICE — GENESIS PIN AND DRILL SET

## (undated) DEVICE — SPONGE GAUZESTER 4 X 4 4PLY - (128PK/CA)

## (undated) DEVICE — DRESSING AQUACEL AG ADVANTAGE 3.5 X 10" (10EA/BX)"

## (undated) DEVICE — GLOVE BIOGEL PI INDICATOR SZ 6.0 SURGICAL PF LF -(200PR/CA)

## (undated) DEVICE — SUTURE 1 VICRYL PLUS CTX - 8 X 18 INCH (12/BX)

## (undated) DEVICE — HANDPIECE 10FT INTPLS SCT PLS IRRIGATION HAND CONTROL SET (6/PK)

## (undated) DEVICE — SUTURE 3-0 MONOCRYL PLUS PS-1 - 27 INCH (36/BX)

## (undated) DEVICE — BANDAGE ELASTIC 4 IN X 5 YDS - LATEX FREE(10/BX 5BX/CA)

## (undated) DEVICE — PAD PREP 24 X 48 CUFFED - (100/CA)

## (undated) DEVICE — DRAPE SURGICAL U 77X120 - (10/CA)

## (undated) DEVICE — DRESSING STERILE BURN ACTICOAT FLEX 7 (50EA/CA)

## (undated) DEVICE — TUBE CONNECTING SUCTION - CLEAR PLASTIC STERILE 72 IN (50EA/CA)

## (undated) DEVICE — BLADE OSCILLATING 9.0X31.0X0.4MM LIKE STRYKER 2296-3-125

## (undated) DEVICE — SYSTEM PREVENA DRESSING CUSTOMIZABLE (5EA/CA)

## (undated) DEVICE — TUBING CLEARLINK DUO-VENT - C-FLO (48EA/CA)

## (undated) DEVICE — DRAPE LARGE 3 QUARTER - (20/CA)

## (undated) DEVICE — SUCTION INSTRUMENT YANKAUER BULBOUS TIP W/O VENT (50EA/CA)

## (undated) DEVICE — LACTATED RINGERS INJ 1000 ML - (14EA/CA 60CA/PF)

## (undated) DEVICE — PACK TOTAL KNEE  (1/CA)

## (undated) DEVICE — KIT ROOM DECONTAMINATION

## (undated) DEVICE — SET LEADWIRE 5 LEAD BEDSIDE DISPOSABLE ECG (1SET OF 5/EA)

## (undated) DEVICE — SUTURE 2-0 MONOCRYL PLUS UNDYED CT-1 1 X 36 (36EA/BX)"

## (undated) DEVICE — GLOVE BIOGEL PI ULTRATOUCH SZ 7.5 SURGICAL PF LF -(50/BX 4BX/CA)

## (undated) DEVICE — BLADE OSCILLATING 9MMX24.6MMX0.4MM LIKE STRYKER 2296-3-111

## (undated) DEVICE — GOWN SURGEONS LARGE - (32/CA)

## (undated) DEVICE — DERMABOND ADVANCED - (12EA/BX)

## (undated) DEVICE — TOWEL STOP TIMEOUT SAFETY FLAG (40EA/CA)

## (undated) DEVICE — DISPOSABLE WOUND VAC PICO 10 X 20 CM - WOUND CARE (3/CA)

## (undated) DEVICE — SET EXTENSION WITH 2 PORTS (48EA/CA) ***PART #2C8610 IS A SUBSTITUTE*****

## (undated) DEVICE — IMPLANT FLEXIBLE DRILL 25MM (1EA)

## (undated) DEVICE — DRAPE C-ARM LARGE 41IN X 74 IN - (10/BX 2BX/CA)

## (undated) DEVICE — DRAPE IOBAN LARGE 2 INCISE FILM (5EA/CA)

## (undated) DEVICE — DRESSING PETROLEUM GAUZE 5 X 9" (50EA/BX 4BX/CA)"